# Patient Record
Sex: MALE | Race: BLACK OR AFRICAN AMERICAN
[De-identification: names, ages, dates, MRNs, and addresses within clinical notes are randomized per-mention and may not be internally consistent; named-entity substitution may affect disease eponyms.]

---

## 2018-10-01 ENCOUNTER — HOSPITAL ENCOUNTER (INPATIENT)
Dept: HOSPITAL 74 - JER | Age: 63
LOS: 3 days | Discharge: HOME | DRG: 199 | End: 2018-10-04
Attending: INTERNAL MEDICINE | Admitting: INTERNAL MEDICINE
Payer: COMMERCIAL

## 2018-10-01 VITALS — BODY MASS INDEX: 30.7 KG/M2

## 2018-10-01 DIAGNOSIS — I25.119: ICD-10-CM

## 2018-10-01 DIAGNOSIS — I21.4: ICD-10-CM

## 2018-10-01 DIAGNOSIS — N17.9: ICD-10-CM

## 2018-10-01 DIAGNOSIS — D64.9: ICD-10-CM

## 2018-10-01 DIAGNOSIS — I16.0: ICD-10-CM

## 2018-10-01 DIAGNOSIS — I16.1: Primary | ICD-10-CM

## 2018-10-01 DIAGNOSIS — E87.6: ICD-10-CM

## 2018-10-01 DIAGNOSIS — R31.29: ICD-10-CM

## 2018-10-01 DIAGNOSIS — I13.10: ICD-10-CM

## 2018-10-01 DIAGNOSIS — N18.9: ICD-10-CM

## 2018-10-01 LAB
ALBUMIN SERPL-MCNC: 3.2 G/DL (ref 3.4–5)
ALP SERPL-CCNC: 79 U/L (ref 45–117)
ALT SERPL-CCNC: 28 U/L (ref 13–61)
ANION GAP SERPL CALC-SCNC: 8 MMOL/L (ref 8–16)
ANION GAP SERPL CALC-SCNC: 9 MMOL/L (ref 8–16)
AST SERPL-CCNC: 36 U/L (ref 15–37)
BASOPHILS # BLD: 0.5 % (ref 0–2)
BILIRUB SERPL-MCNC: 0.6 MG/DL (ref 0.2–1)
BUN SERPL-MCNC: 29 MG/DL (ref 7–18)
BUN SERPL-MCNC: 31 MG/DL (ref 7–18)
CALCIUM SERPL-MCNC: 8.1 MG/DL (ref 8.5–10.1)
CALCIUM SERPL-MCNC: 9.2 MG/DL (ref 8.5–10.1)
CHLORIDE SERPL-SCNC: 100 MMOL/L (ref 98–107)
CHLORIDE SERPL-SCNC: 102 MMOL/L (ref 98–107)
CO2 SERPL-SCNC: 33 MMOL/L (ref 21–32)
CO2 SERPL-SCNC: 34 MMOL/L (ref 21–32)
CREAT SERPL-MCNC: 2.2 MG/DL (ref 0.55–1.3)
CREAT SERPL-MCNC: 2.5 MG/DL (ref 0.55–1.3)
DEPRECATED RDW RBC AUTO: 16.5 % (ref 11.9–15.9)
EOSINOPHIL # BLD: 0.5 % (ref 0–4.5)
GLUCOSE SERPL-MCNC: 114 MG/DL (ref 74–106)
GLUCOSE SERPL-MCNC: 201 MG/DL (ref 74–106)
HCT VFR BLD CALC: 38.5 % (ref 35.4–49)
HGB BLD-MCNC: 12.1 GM/DL (ref 11.7–16.9)
LYMPHOCYTES # BLD: 14.3 % (ref 8–40)
MAGNESIUM SERPL-MCNC: 2.7 MG/DL (ref 1.8–2.4)
MCH RBC QN AUTO: 23.7 PG (ref 25.7–33.7)
MCHC RBC AUTO-ENTMCNC: 31.5 G/DL (ref 32–35.9)
MCV RBC: 75.2 FL (ref 80–96)
MONOCYTES # BLD AUTO: 5.6 % (ref 3.8–10.2)
NEUTROPHILS # BLD: 79.1 % (ref 42.8–82.8)
PHOSPHATE SERPL-MCNC: 1.2 MG/DL (ref 2.5–4.9)
PLATELET # BLD AUTO: 230 K/MM3 (ref 134–434)
PMV BLD: 8.5 FL (ref 7.5–11.1)
POTASSIUM SERPLBLD-SCNC: 2.3 MMOL/L (ref 3.5–5.1)
POTASSIUM SERPLBLD-SCNC: 2.3 MMOL/L (ref 3.5–5.1)
PROT SERPL-MCNC: 7.5 G/DL (ref 6.4–8.2)
RBC # BLD AUTO: 5.12 M/MM3 (ref 4–5.6)
SODIUM SERPL-SCNC: 143 MMOL/L (ref 136–145)
SODIUM SERPL-SCNC: 144 MMOL/L (ref 136–145)
WBC # BLD AUTO: 7.4 K/MM3 (ref 4–10)

## 2018-10-01 PROCEDURE — G0378 HOSPITAL OBSERVATION PER HR: HCPCS

## 2018-10-01 RX ADMIN — POTASSIUM CHLORIDE SCH MLS/HR: 7.46 INJECTION, SOLUTION INTRAVENOUS at 17:15

## 2018-10-01 RX ADMIN — POTASSIUM CHLORIDE SCH MLS/HR: 7.46 INJECTION, SOLUTION INTRAVENOUS at 16:07

## 2018-10-01 RX ADMIN — POTASSIUM CHLORIDE SCH MLS/HR: 7.46 INJECTION, SOLUTION INTRAVENOUS at 22:43

## 2018-10-01 NOTE — PDOC
History of Present Illness





- General


History Source: Patient


Exam Limitations: No Limitations





- History of Present Illness


Initial Comments: 





10/01/18 13:44


*Pt is somewhat a poor historian


Pt is a 64yo m with PMH of HTN presenting to ED with elevated blood pressure. 

Pt said he checked his bp yesterday at home with a home bp cuff and said it was 

high. Pt could not remember what the number was. Pt recently moved here from 

[place] around 3 months ago. He saw a physician at the [facility] when he was told 

he had hypertension. He was given lisinopril/hctz 10-12.5. He ran out of his 

medications 2 days ago. He has not seen a physician here. He denies headache, 

changes in vision, chest pain, SOB, back pain, neck pain, numbness/tingling/

weakness, abdominal pain, n/v/d, urinary symptoms, changes in bowel habits. 

Denies history of MI or CVA in self and denies family history of MI/CVA.





PCP: none


PMH: htn


PSH: 3 years ago had suprapubic catheter and L kidney/ureter surgery


Meds: lisinipril/hctz


Allergies: nkda


Social: denies








<Chhaya Perez - Last Filed: 10/01/18 14:14>





<Ania Marino - Last Filed: 10/01/18 15:27>





- General


Chief Complaint: Blood Pressure Problem


Stated Complaint: BLOOD PRESSURE PROBLEM


Time Seen by Provider: 10/01/18 11:05





Past History





- Past Medical History


COPD: No


HTN: Yes





- Immunization History


Immunization Up to Date: Yes





- Suicide/Smoking/Psychosocial Hx


Smoking History: Never smoked


Hx Alcohol Use: No


Drug/Substance Use Hx: No





<Chhaya Perez - Last Filed: 10/01/18 14:14>





<Ania Marino - Last Filed: 10/01/18 15:27>





- Past Medical History


Allergies/Adverse Reactions: 


 Allergies











Allergy/AdvReac Type Severity Reaction Status Date / Time


 


No Known Allergies Allergy   Verified 10/01/18 10:08











Home Medications: 


Ambulatory Orders





Aspirin [ASA -] 81 mg PO DAILY 10/01/18 


Lisinopril/Hydrochlorothiazide [Lisinopril-Hctz 10-12.5 mg Tab] 1 each PO BID 10

/01/18 











**Review of Systems





- Review of Systems


Able to Perform ROS?: Yes


Is the patient limited English proficient: No


Constitutional: No: Chills, Fever, Weakness, Unintentional Wgt. Loss


HEENTM: No: Blurred Vision, Recent change in vision, Double Vision, Hearing Loss

, Throat Pain


Respiratory: No: Cough, Shortness of Breath, Hemoptysis


Cardiac (ROS): No: Chest Pain, Lightheadedness, Palpitations, Syncope


ABD/GI: No: Diarrhea, Nausea, Poor Appetite, Vomiting, Abdominal cramping


: No: Burning, Dysuria, Hematuria


Musculoskeletal: No: Back Pain, Joint Pain, Muscle Pain, Neck Pain


Neurological: No: Headache, Numbness, Paresthesia, Tingling, Weakness


Hematologic/Lymphatic: No: Anemia, Blood Clots





<Chhaya Perez - Last Filed: 10/01/18 14:14>





*Physical Exam





- Vital Signs


 Last Vital Signs











Temp Pulse Resp BP Pulse Ox


 


 98.9 F   100 H  18   228/107 H  98 


 


 10/01/18 10:09  10/01/18 10:09  10/01/18 10:09  10/01/18 10:09  10/01/18 11:35














- Physical Exam


Comments: 





10/01/18 14:14


Pt sitting in bed comfortably


General Appearance: Yes: Nourished, Appropriately Dressed.  No: Apparent 

Distress


HEENT: positive: EOMI, NAEL, Pharynx Normal, Hearing Grossly Normal.  negative: 

Pale Conjunctivae, Photophobia, Scleral Icterus (R), Scleral Icterus (L), 

Pharyngeal Erythema, Nasal Congestion, Sinus Tenderness, Thrush


Neck: positive: Trachea midline, Supple.  negative: Carotid bruit, 

Lymphadenopathy (R), Lymphadenopathy (L)


Respiratory/Chest: positive: Lungs Clear, Normal Breath Sounds.  negative: 

Crackles, Rales, Rhonchi, Stridor, Wheezing


Cardiovascular: positive: Regular Rhythm, S1, S2, Tachycardia, Other (s2 click 

heard over aortic valve).  negative: Edema, JVD, Murmur


Vascular Pulses: Carotid (R): 2+, Carotid (L): 2+, Dorsalis-Pedis (R): 2+, 

Doralis-Pedis (L): 2+


Gastrointestinal/Abdominal: positive: Normal Bowel Sounds, Soft.  negative: 

Distended, Guarding, Rebound, Tenderness


Musculoskeletal: negative: CVA Tenderness, CVA Tenderness (R), CVA Tenderness (L

), Decreased Range of Motion


Extremity: positive: Normal Capillary Refill.  negative: Pedal Edema, Swelling, 

Calf Tenderness, Erythema


Integumentary: positive: Normal Color, Dry, Warm.  negative: Pale, Cold, Rash, 

Swelling


Neurologic: positive: CNs II-XII NML intact, Fully Oriented, Alert, Normal Mood/

Affect, Normal Response, Motor Strength 5/5.  negative: Numbness, Sensory 

Deficit


Deep Tendon Reflexes: Ankle (L): 2+, Ankle (R): 2+, Knee (L): 2+, Knee (R): 2+





<Chhaya Perez - Last Filed: 10/01/18 14:14>





- Vital Signs


 Last Vital Signs











Temp Pulse Resp BP Pulse Ox


 


 98.9 F   89   18   184/111 H  97 


 


 10/01/18 10:09  10/01/18 15:10  10/01/18 15:10  10/01/18 15:10  10/01/18 15:10














<Ania Marino - Last Filed: 10/01/18 15:27>





ED Treatment Course





- LABORATORY


CBC & Chemistry Diagram: 


 10/01/18 12:09





 10/01/18 12:09





- ADDITIONAL ORDERS


Additional order review: 


 Laboratory  Results











  10/01/18 10/01/18 10/01/18





  12:42 12:09 12:09


 


PTT (Actin FS)    37.4 H


 


Sodium   143 


 


Potassium   2.3 L* 


 


Chloride   100 


 


Carbon Dioxide   34 H 


 


Anion Gap   9 


 


BUN   31 H 


 


Creatinine   2.5 H 


 


Creat Clearance w eGFR   26.13 


 


Random Glucose   201 H 


 


Calcium   9.2 


 


Phosphorus   1.2 L 


 


Magnesium   2.7 H 


 


Total Bilirubin   0.6 


 


AST   36 


 


ALT   28 


 


Alkaline Phosphatase   79 


 


Creatine Kinase  628 H  


 


Creatine Kinase Index  0.6  


 


CK-MB (CK-2)  3.8 H  


 


Troponin I  0.20 H  


 


Total Protein   7.5 


 


Albumin   3.2 L 








 











  10/01/18





  12:09


 


RBC  5.12


 


MCV  75.2 L


 


MCHC  31.5 L


 


RDW  16.5 H


 


MPV  8.5


 


Neutrophils %  79.1


 


Lymphocytes %  14.3


 


Monocytes %  5.6


 


Eosinophils %  0.5


 


Basophils %  0.5














- RADIOLOGY


Radiology Studies Ordered: 














 Category Date Time Status


 


 CHEST X-RAY PORTABLE* [RAD] Stat Radiology  10/01/18 11:54 Completed














- Medications


Given in the ED: 


ED Medications














Discontinued Medications














Generic Name Dose Route Start Last Admin





  Trade Name Janeen  PRN Reason Stop Dose Admin


 


Hydrochlorothiazide  12.5 mg  10/01/18 12:06  10/01/18 12:14





  Hctz -  PO  10/01/18 12:07  12.5 mg





  ONCE ONE   Administration





     





     





     





     


 


Lisinopril  10 mg  10/01/18 12:06  10/01/18 12:14





  Prinivil  PO  10/01/18 12:07  10 mg





  ONCE ONE   Administration





     





     





     





     














<Chhaya Perez - Last Filed: 10/01/18 14:14>





- LABORATORY


CBC & Chemistry Diagram: 


 10/01/18 12:09





 10/01/18 12:09





- ADDITIONAL ORDERS


Additional order review: 


 Laboratory  Results











  10/01/18 10/01/18 10/01/18





  12:42 12:09 12:09


 


PTT (Actin FS)    37.4 H


 


Sodium   143 


 


Potassium   2.3 L* 


 


Chloride   100 


 


Carbon Dioxide   34 H 


 


Anion Gap   9 


 


BUN   31 H 


 


Creatinine   2.5 H 


 


Creat Clearance w eGFR   26.13 


 


Random Glucose   201 H 


 


Calcium   9.2 


 


Phosphorus   1.2 L 


 


Magnesium   2.7 H 


 


Total Bilirubin   0.6 


 


AST   36 


 


ALT   28 


 


Alkaline Phosphatase   79 


 


Creatine Kinase  628 H  


 


Creatine Kinase Index  0.6  


 


CK-MB (CK-2)  3.8 H  


 


Troponin I  0.20 H  


 


Total Protein   7.5 


 


Albumin   3.2 L 








 











  10/01/18





  12:09


 


RBC  5.12


 


MCV  75.2 L


 


MCHC  31.5 L


 


RDW  16.5 H


 


MPV  8.5


 


Neutrophils %  79.1


 


Lymphocytes %  14.3


 


Monocytes %  5.6


 


Eosinophils %  0.5


 


Basophils %  0.5














- Medications


Given in the ED: 


ED Medications














Discontinued Medications














Generic Name Dose Route Start Last Admin





  Trade Name Janeen  PRN Reason Stop Dose Admin


 


Hydrochlorothiazide  12.5 mg  10/01/18 12:06  10/01/18 12:14





  Hctz -  PO  10/01/18 12:07  12.5 mg





  ONCE ONE   Administration





     





     





     





     


 


Labetalol HCl  20 mg  10/01/18 13:28  10/01/18 14:07





  Normodyne Injection -  IVPUSH  10/01/18 13:29  Not Given





  ONCE ONE   





     





     





     





     


 


Lisinopril  10 mg  10/01/18 12:06  10/01/18 12:14





  Prinivil  PO  10/01/18 12:07  10 mg





  ONCE ONE   Administration





     





     





     





     














<Ania Marino - Last Filed: 10/01/18 15:27>





Medical Decision Making





- Medical Decision Making





10/01/18 13:49


64yo m with PMH of htn presenting with elevated blood pressure. BP at 

evaluation was 240/120. Pt not having any symptoms. 


   Vitals: hypertensive, tachycardic, afebrile


   PE: click of aortic valve on S2. Otherwise benign





Ddx: htn urgency v. emergency





Will order basic labs, ekg, troponin, ua to evaluate for end organ damage. 





10/01/18 13:56


Repeat bp 197/101, hr 90, 98% saturation





<Chhaya Perez - Last Filed: 10/01/18 14:14>





*DC/Admit/Observation/Transfer





- Discharge Dispostion


Decision to Admit order: Yes


Decision to Admit order Date/Time: 


Decision to Admit Order











 Category Date Time Status


 


 Decision to Admit to Hospital Routine Admission  10/01/18 13:28 Active














<Chhaya Perez - Last Filed: 10/01/18 14:14>





<Ania Marino - Last Filed: 10/01/18 15:27>


Diagnosis at time of Disposition: 


 Hypertensive emergency, NSTEMI (non-ST elevated myocardial infarction), 

Elevated serum creatinine, Hypokalemia








- Discharge Dispostion


Condition at time of disposition: Fair

## 2018-10-01 NOTE — HP
Admitting History and Physical





- Primary Care Physician


PCP: Tl Benjamin





- Admission


Chief Complaint: elevated bp


History of Present Illness: 





Pt is a 62yo m with PMH of HTN presenting to ED with elevated blood pressure. 

Pt said he checked his bp yesterday at home with a home bp cuff and said it was 

high. Pt could not remember what the number was. Pt recently moved here from 

[place] around 3 months ago. He saw a physician at the [facility] when he was told 

he had hypertension. He was given lisinopril/hctz 10-12.5. He ran out of his 

medications 2 days ago. He has not seen a physician here. He denies headache, 

changes in vision, chest pain, SOB, back pain, neck pain, numbness/tingling/

weakness, abdominal pain, n/v/d, urinary symptoms, changes in bowel habits. 

Denies history of MI or CVA in self and denies family history of MI/CVA.














- Past Medical History


Cardiovascular: Yes: HTN





- Smoking History


Smoking history: Never smoked





- Alcohol/Substance Use


Hx Alcohol Use: No





Home Medications





- Allergies


Allergies/Adverse Reactions: 


 Allergies











Allergy/AdvReac Type Severity Reaction Status Date / Time


 


No Known Allergies Allergy   Verified 10/01/18 10:08














- Home Medications


Home Medications: 


Ambulatory Orders





Aspirin [ASA -] 81 mg PO DAILY 10/01/18 


Carvedilol [Coreg -] 12.5 mg PO BID #60 tablet 10/04/18 


Nifedipine ER [Procardia XL -] 60 mg PO DAILY #30 tab.er.24 10/04/18 











Physical Examination


Vital Signs: 


 Vital Signs











Temperature  98.9 F   10/01/18 10:09


 


Pulse Rate  89   10/01/18 15:10


 


Respiratory Rate  18   10/01/18 15:10


 


Blood Pressure  184/111 H  10/01/18 15:10


 


O2 Sat by Pulse Oximetry (%)  97   10/01/18 15:10











Constitutional: Yes: No Distress


HENT: Yes: Atraumatic


Neck: Yes: Supple


Cardiovascular: Yes: Regular Rate and Rhythm


Respiratory: Yes: CTA Bilaterally


Gastrointestinal: Yes: Normal Bowel Sounds


Extremities: Yes: WNL


Edema: No


Peripheral Pulses WNL: Yes


Neurological: Yes: Alert, Oriented


Labs: 


 CBC, BMP





 10/01/18 12:09 





 10/01/18 12:09 











Imaging





- Results


X-ray: Report Reviewed





Problem List





- Problems


(1) Acute-on-chronic kidney injury


Code(s): N17.9 - ACUTE KIDNEY FAILURE, UNSPECIFIED; N18.9 - CHRONIC KIDNEY 

DISEASE, UNSPECIFIED   


Qualifiers: 


   Acute renal failure type: unspecified 





(2) Demand ischemia


Code(s): I24.8 - OTHER FORMS OF ACUTE ISCHEMIC HEART DISEASE   





(3) Elevated serum creatinine


Code(s): R79.89 - OTHER SPECIFIED ABNORMAL FINDINGS OF BLOOD CHEMISTRY   





(4) Hypertensive urgency


Code(s): I16.0 - HYPERTENSIVE URGENCY   





Assessment/Plan





 Laboratory Tests











  10/01/18 10/01/18 10/01/18





  12:09 12:09 12:09


 


WBC  7.4  


 


RBC  5.12  


 


Hgb  12.1  


 


Hct  38.5  


 


MCV  75.2 L  


 


MCH  23.7 L  


 


MCHC  31.5 L  


 


RDW  16.5 H  


 


Plt Count  230  


 


MPV  8.5  


 


Absolute Neuts (auto)  5.9  


 


Neutrophils %  79.1  


 


Lymphocytes %  14.3  


 


Monocytes %  5.6  


 


Eosinophils %  0.5  


 


Basophils %  0.5  


 


Nucleated RBC %  0  


 


PTT (Actin FS)   37.4 H 


 


Sodium    143


 


Potassium    2.3 L*


 


Chloride    100


 


Carbon Dioxide    34 H


 


Anion Gap    9


 


BUN    31 H


 


Creatinine    2.5 H


 


Creat Clearance w eGFR    26.13


 


Random Glucose    201 H


 


Serum Osmolality   


 


Calcium    9.2


 


Phosphorus    1.2 L


 


Magnesium    2.7 H


 


Total Bilirubin    0.6


 


AST    36


 


ALT    28


 


Alkaline Phosphatase    79


 


Creatine Kinase   


 


Creatine Kinase Index   


 


CK-MB (CK-2)   


 


Troponin I   


 


Total Protein    7.5


 


Albumin    3.2 L


 


Urine Color   


 


Urine Appearance   


 


Urine pH   


 


Ur Specific Gravity   


 


Urine Protein   


 


Urine Glucose (UA)   


 


Urine Ketones   


 


Urine Blood   


 


Urine Nitrite   


 


Urine Bilirubin   


 


Urine Urobilinogen   


 


Ur Leukocyte Esterase   


 


Urine WBC (Auto)   


 


Urine RBC (Auto)   


 


Ur Epithelial Cells   


 


Urine Bacteria   


 


Urine Mucus   


 


Urine Osmolality   


 


Ur Random Sodium   


 


Ur Random Potassium   


 


Ur Random Chloride   














  10/01/18 10/01/18 10/02/18





  12:42 23:10 02:48


 


WBC   


 


RBC   


 


Hgb   


 


Hct   


 


MCV   


 


MCH   


 


MCHC   


 


RDW   


 


Plt Count   


 


MPV   


 


Absolute Neuts (auto)   


 


Neutrophils %   


 


Lymphocytes %   


 


Monocytes %   


 


Eosinophils %   


 


Basophils %   


 


Nucleated RBC %   


 


PTT (Actin FS)   


 


Sodium   144 


 


Potassium   2.3 L* 


 


Chloride   102 


 


Carbon Dioxide   33 H 


 


Anion Gap   8 


 


BUN   29 H 


 


Creatinine   2.2 H 


 


Creat Clearance w eGFR   30.28 


 


Random Glucose   114 H 


 


Serum Osmolality   


 


Calcium   8.1 L 


 


Phosphorus   


 


Magnesium   


 


Total Bilirubin   


 


AST   


 


ALT   


 


Alkaline Phosphatase   


 


Creatine Kinase  628 H  469 H 


 


Creatine Kinase Index  0.6  0.5 


 


CK-MB (CK-2)  3.8 H  2.7 


 


Troponin I  0.20 H  0.19 H 


 


Total Protein   


 


Albumin   


 


Urine Color    Straw


 


Urine Appearance    Clear


 


Urine pH    7.0


 


Ur Specific [place]    1.011


 


Urine Protein    Negative


 


Urine Glucose (UA)    Negative


 


Urine Ketones    Negative


 


Urine Blood    2+ H


 


Urine Nitrite    Negative


 


Urine Bilirubin    Negative


 


Urine Urobilinogen    Negative


 


Ur Leukocyte Esterase    Trace


 


Urine WBC (Auto)    6


 


Urine RBC (Auto)    14


 


Ur Epithelial Cells    Rare


 


Urine Bacteria    Few


 


Urine Mucus    Rare


 


Urine Osmolality   


 


Ur Random Sodium   


 


Ur Random Potassium   


 


Ur Random Chloride   














  10/02/18 10/02/18 10/02/18





  05:30 05:30 12:48


 


WBC  6.9  


 


RBC  4.50  


 


Hgb  10.8 L  


 


Hct  33.8 L  


 


MCV  75.0 L  


 


MCH  24.1 L  


 


MCHC  32.1  


 


RDW  16.3 H  


 


Plt Count  208  


 


MPV  8.7  


 


Absolute Neuts (auto)  4.6  


 


Neutrophils %  66.4  


 


Lymphocytes %  24.1  D  


 


Monocytes %  6.9  


 


Eosinophils %  2.0  D  


 


Basophils %  0.6  


 


Nucleated RBC %  0  


 


PTT (Actin FS)   


 


Sodium   144  142


 


Potassium   2.3 L*  2.8 L*


 


Chloride   102  101


 


Carbon Dioxide   31  36 H


 


Anion Gap   11  5 L


 


BUN   27 H  28 H


 


Creatinine   2.1 H  2.0 H


 


Creat Clearance w eGFR   31.96  33.81


 


Random Glucose   95  84


 


Serum Osmolality   


 


Calcium   8.5  8.5


 


Phosphorus   


 


Magnesium   


 


Total Bilirubin   0.7 


 


AST   28 


 


ALT   20 


 


Alkaline Phosphatase   68 


 


Creatine Kinase   


 


Creatine Kinase Index   


 


CK-MB (CK-2)   


 


Troponin I   


 


Total Protein   6.4 


 


Albumin   2.8 L 


 


Urine Color   


 


Urine Appearance   


 


Urine pH   


 


Ur Specific Gravity   


 


Urine Protein   


 


Urine Glucose (UA)   


 


Urine Ketones   


 


Urine Blood   


 


Urine Nitrite   


 


Urine Bilirubin   


 


Urine Urobilinogen   


 


Ur Leukocyte Esterase   


 


Urine WBC (Auto)   


 


Urine RBC (Auto)   


 


Ur Epithelial Cells   


 


Urine Bacteria   


 


Urine Mucus   


 


Urine Osmolality   


 


Ur Random Sodium   


 


Ur Random Potassium   


 


Ur Random Chloride   














  10/02/18 10/02/18 10/03/18





  12:48 18:00 05:30


 


WBC   


 


RBC   


 


Hgb   


 


Hct   


 


MCV   


 


MCH   


 


MCHC   


 


RDW   


 


Plt Count   


 


MPV   


 


Absolute Neuts (auto)   


 


Neutrophils %   


 


Lymphocytes %   


 


Monocytes %   


 


Eosinophils %   


 


Basophils %   


 


Nucleated RBC %   


 


PTT (Actin FS)   


 


Sodium   138  144


 


Potassium   2.7 L*  3.0 L


 


Chloride   103  106


 


Carbon Dioxide   28  27


 


Anion Gap   7 L  11


 


BUN   25 H  21 H


 


Creatinine   2.0 H  1.8 H


 


Creat Clearance w eGFR   33.81  38.18


 


Random Glucose   113 H  88


 


Serum Osmolality  296  


 


Calcium   8.5  8.3 L


 


Phosphorus   


 


Magnesium   


 


Total Bilirubin    0.7


 


AST    29


 


ALT    21


 


Alkaline Phosphatase    73


 


Creatine Kinase   


 


Creatine Kinase Index   


 


CK-MB (CK-2)   


 


Troponin I   


 


Total Protein    6.6


 


Albumin    2.8 L


 


Urine Color   


 


Urine Appearance   


 


Urine pH   


 


Ur Specific Gravity   


 


Urine Protein   


 


Urine Glucose (UA)   


 


Urine Ketones   


 


Urine Blood   


 


Urine Nitrite   


 


Urine Bilirubin   


 


Urine Urobilinogen   


 


Ur Leukocyte Esterase   


 


Urine WBC (Auto)   


 


Urine RBC (Auto)   


 


Ur Epithelial Cells   


 


Urine Bacteria   


 


Urine Mucus   


 


Urine Osmolality   


 


Ur Random Sodium   


 


Ur Random Potassium   


 


Ur Random Chloride   














  10/03/18 10/03/18 10/03/18





  08:30 08:30 16:55


 


WBC   


 


RBC   


 


Hgb   


 


Hct   


 


MCV   


 


MCH   


 


MCHC   


 


RDW   


 


Plt Count   


 


MPV   


 


Absolute Neuts (auto)   


 


Neutrophils %   


 


Lymphocytes %   


 


Monocytes %   


 


Eosinophils %   


 


Basophils %   


 


Nucleated RBC %   


 


PTT (Actin FS)   


 


Sodium    138


 


Potassium    3.2 L


 


Chloride    106


 


Carbon Dioxide    27


 


Anion Gap    6 L


 


BUN    21 H


 


Creatinine    1.8 H


 


Creat Clearance w eGFR    38.18


 


Random Glucose    106


 


Serum Osmolality   


 


Calcium    8.5


 


Phosphorus   


 


Magnesium   


 


Total Bilirubin   


 


AST   


 


ALT   


 


Alkaline Phosphatase   


 


Creatine Kinase   


 


Creatine Kinase Index   


 


CK-MB (CK-2)   


 


Troponin I   


 


Total Protein   


 


Albumin   


 


Urine Color   


 


Urine Appearance   


 


Urine pH   


 


Ur Specific Gravity   


 


Urine Protein   


 


Urine Glucose (UA)   


 


Urine Ketones   


 


Urine Blood   


 


Urine Nitrite   


 


Urine Bilirubin   


 


Urine Urobilinogen   


 


Ur Leukocyte Esterase   


 


Urine WBC (Auto)   


 


Urine RBC (Auto)   


 


Ur Epithelial Cells   


 


Urine Bacteria   


 


Urine Mucus   


 


Urine Osmolality   454 


 


Ur Random Sodium  129  


 


Ur Random Potassium  18.0 L  


 


Ur Random Chloride  120  














  10/04/18





  07:05


 


WBC 


 


RBC 


 


Hgb 


 


Hct 


 


MCV 


 


MCH 


 


MCHC 


 


RDW 


 


Plt Count 


 


MPV 


 


Absolute Neuts (auto) 


 


Neutrophils % 


 


Lymphocytes % 


 


Monocytes % 


 


Eosinophils % 


 


Basophils % 


 


Nucleated RBC % 


 


PTT (Actin FS) 


 


Sodium  141


 


Potassium  3.5


 


Chloride  106


 


Carbon Dioxide  28


 


Anion Gap  6 L


 


BUN  25 H


 


Creatinine  1.9 H


 


Creat Clearance w eGFR  35.87


 


Random Glucose  91


 


Serum Osmolality 


 


Calcium  8.9


 


Phosphorus 


 


Magnesium 


 


Total Bilirubin  0.6


 


AST  28


 


ALT  20


 


Alkaline Phosphatase  72


 


Creatine Kinase 


 


Creatine Kinase Index 


 


CK-MB (CK-2) 


 


Troponin I 


 


Total Protein  6.9


 


Albumin  2.8 L


 


Urine Color 


 


Urine Appearance 


 


Urine pH 


 


Ur Specific Gravity 


 


Urine Protein 


 


Urine Glucose (UA) 


 


Urine Ketones 


 


Urine Blood 


 


Urine Nitrite 


 


Urine Bilirubin 


 


Urine Urobilinogen 


 


Ur Leukocyte Esterase 


 


Urine WBC (Auto) 


 


Urine RBC (Auto) 


 


Ur Epithelial Cells 


 


Urine Bacteria 


 


Urine Mucus 


 


Urine Osmolality 


 


Ur Random Sodium 


 


Ur Random Potassium 


 


Ur Random Chloride 








Active Medications











Generic Name Dose Route Start Last Admin





  Trade Name Freq  PRN Reason Stop Dose Admin


 


Carvedilol  12.5 mg  10/04/18 11:20  





  Coreg -  PO   





  BID GUALBERTO   





     





     





     





     


 


Nifedipine  60 mg  10/04/18 10:00  10/04/18 11:11





  Procardia Xl -  PO   60 mg





  DAILY GUALBERTO   Administration





     





     





     





     


 


Potassium Chloride  40 meq  10/02/18 10:00  10/04/18 11:11





  K-Dur -  PO   40 meq





  DAILY GUALBERTO   Administration

## 2018-10-01 NOTE — PDOC
Attending Attestation





- Resident


Resident Name: Chhaya Perez





- ED Attending Attestation


I have performed the following: I have examined & evaluated the patient, The 

case was reviewed & discussed with the resident, I agree w/resident's findings 

& plan





- HPI


HPI: 





10/01/18 15:25





63 YOM, with a significant past medical history of hypertension, who presents 

to the emergency department with, elevated blood pressure. As per patient, he 

arrived from [place] 3 months ago and was seen by a [facility] doctor who put 

him on Lisinopril/HCTZ BID. Patient notes that he ran out of blood pressure 

medication 4 days ago. He took his blood pressure using an at home cuff and 

obtained a reading of 248/120. Patient notes that his blood pressure was not 

well controlled with his medication. He was unable to report to the ED 

yesterday thus, prompting his visit today. While in the ED, the patient notes 

to be asymptomatic without any complaints.





He denies any recent fevers, chills, headache or dizziness. He denies any 

recent nausea, vomit, diarrhea or constipation. He denies any recent chest pain 

or shortness of breath. He denies any recent dysuria, frequency, urgency or 

hematuria.





Allergies: NKA


Past surgical history: None reported.


Social History: Nonsmoker. Denies EtOH use and recreational drug use. 











- Physicial Exam


PE: 





10/01/18 15:26





NAD, well appearing, MMM, nl conjunctiva, anicteric; neck supple. no JVD. lungs 

clear, RRR, +systolic murmur, abdomen soft nontender. ARRIAZA x4, no focal neuro 

deficits. No peripheral edema. normal color for ethnicity, WW.








- Medical Decision Making





10/01/18 13:32





Becky 63 YOM from [place] x 3  months ago presenting with asymptomatic 

hypertension. Ran out of antihypertensives (lisinopril/hctz combo, which he has 

been taking x 2 weeks) 2 days ago, BP rechecked at home which has remained 

elevated.


denies symptoms. no recent illnesses.





Vitals notable for elevated BP: 248/120, . No symptoms.


EKG normal sinus rhythm, no interval abnormalities, narrow QRS, ST and T wave 

segments and morphology normal. Nonspecific T wave abnormalities. LVH, left 

axis deviation.


Labs and lytes with elevated Cr 2.5 and trop, with e/o end organ damage in the 

setting of hypertensive emergency.


trop elevated 0.2, given ASA and on tele monitoring.


potassium low 2.3, on tele and repleted with 3 runs of IV potassium and PO 

40meq. 


Given PO lisinopril/hctz, slow reduction in BP down to 180s/100s, allowing for 

MAP reduction by no more than 25% in first 24 hours.





Will admit for BP management, continuous tele monitoring and medical management

, electrolyte monitoring. Dr Benjamin to admit.


Dx. hypertensive emergency, ZACK, electrolyte derangements with hypo-K.





10/01/18 15:25

## 2018-10-02 LAB
ALBUMIN SERPL-MCNC: 2.8 G/DL (ref 3.4–5)
ALP SERPL-CCNC: 68 U/L (ref 45–117)
ALT SERPL-CCNC: 20 U/L (ref 13–61)
ANION GAP SERPL CALC-SCNC: 11 MMOL/L (ref 8–16)
ANION GAP SERPL CALC-SCNC: 5 MMOL/L (ref 8–16)
ANION GAP SERPL CALC-SCNC: 7 MMOL/L (ref 8–16)
APPEARANCE UR: CLEAR
AST SERPL-CCNC: 28 U/L (ref 15–37)
BACTERIA #/AREA URNS HPF: (no result) /HPF
BASOPHILS # BLD: 0.6 % (ref 0–2)
BILIRUB SERPL-MCNC: 0.7 MG/DL (ref 0.2–1)
BILIRUB UR STRIP.AUTO-MCNC: NEGATIVE MG/DL
BUN SERPL-MCNC: 25 MG/DL (ref 7–18)
BUN SERPL-MCNC: 27 MG/DL (ref 7–18)
BUN SERPL-MCNC: 28 MG/DL (ref 7–18)
CALCIUM SERPL-MCNC: 8.5 MG/DL (ref 8.5–10.1)
CHLORIDE SERPL-SCNC: 101 MMOL/L (ref 98–107)
CHLORIDE SERPL-SCNC: 102 MMOL/L (ref 98–107)
CHLORIDE SERPL-SCNC: 103 MMOL/L (ref 98–107)
CO2 SERPL-SCNC: 28 MMOL/L (ref 21–32)
CO2 SERPL-SCNC: 31 MMOL/L (ref 21–32)
CO2 SERPL-SCNC: 36 MMOL/L (ref 21–32)
COLOR UR: (no result)
CREAT SERPL-MCNC: 2 MG/DL (ref 0.55–1.3)
CREAT SERPL-MCNC: 2 MG/DL (ref 0.55–1.3)
CREAT SERPL-MCNC: 2.1 MG/DL (ref 0.55–1.3)
DEPRECATED RDW RBC AUTO: 16.3 % (ref 11.9–15.9)
EOSINOPHIL # BLD: 2 % (ref 0–4.5)
EPITH CASTS URNS QL MICRO: (no result) /HPF
GLUCOSE SERPL-MCNC: 113 MG/DL (ref 74–106)
GLUCOSE SERPL-MCNC: 84 MG/DL (ref 74–106)
GLUCOSE SERPL-MCNC: 95 MG/DL (ref 74–106)
HCT VFR BLD CALC: 33.8 % (ref 35.4–49)
HGB BLD-MCNC: 10.8 GM/DL (ref 11.7–16.9)
KETONES UR QL STRIP: NEGATIVE
LEUKOCYTE ESTERASE UR QL STRIP.AUTO: (no result)
LYMPHOCYTES # BLD: 24.1 % (ref 8–40)
MCH RBC QN AUTO: 24.1 PG (ref 25.7–33.7)
MCHC RBC AUTO-ENTMCNC: 32.1 G/DL (ref 32–35.9)
MCV RBC: 75 FL (ref 80–96)
MONOCYTES # BLD AUTO: 6.9 % (ref 3.8–10.2)
MUCOUS THREADS URNS QL MICRO: (no result)
NEUTROPHILS # BLD: 66.4 % (ref 42.8–82.8)
NITRITE UR QL STRIP: NEGATIVE
PH UR: 7 [PH] (ref 5–8)
PLATELET # BLD AUTO: 208 K/MM3 (ref 134–434)
PMV BLD: 8.7 FL (ref 7.5–11.1)
POTASSIUM SERPLBLD-SCNC: 2.3 MMOL/L (ref 3.5–5.1)
POTASSIUM SERPLBLD-SCNC: 2.7 MMOL/L (ref 3.5–5.1)
POTASSIUM SERPLBLD-SCNC: 2.8 MMOL/L (ref 3.5–5.1)
PROT SERPL-MCNC: 6.4 G/DL (ref 6.4–8.2)
PROT UR QL STRIP: NEGATIVE
PROT UR QL STRIP: NEGATIVE
RBC # BLD AUTO: 4.5 M/MM3 (ref 4–5.6)
SODIUM SERPL-SCNC: 138 MMOL/L (ref 136–145)
SODIUM SERPL-SCNC: 142 MMOL/L (ref 136–145)
SODIUM SERPL-SCNC: 144 MMOL/L (ref 136–145)
SP GR UR: 1.01 (ref 1–1.03)
UROBILINOGEN UR STRIP-MCNC: NEGATIVE MG/DL (ref 0.2–1)
WBC # BLD AUTO: 6.9 K/MM3 (ref 4–10)

## 2018-10-02 RX ADMIN — HYDRALAZINE HYDROCHLORIDE SCH: 25 TABLET, FILM COATED ORAL at 22:21

## 2018-10-02 RX ADMIN — POTASSIUM CHLORIDE SCH MEQ: 1500 TABLET, EXTENDED RELEASE ORAL at 14:43

## 2018-10-02 RX ADMIN — POTASSIUM CHLORIDE SCH MLS/HR: 7.46 INJECTION, SOLUTION INTRAVENOUS at 21:46

## 2018-10-02 RX ADMIN — POTASSIUM CHLORIDE SCH MLS/HR: 7.46 INJECTION, SOLUTION INTRAVENOUS at 18:54

## 2018-10-02 RX ADMIN — POTASSIUM CHLORIDE SCH MLS/HR: 7.46 INJECTION, SOLUTION INTRAVENOUS at 11:11

## 2018-10-02 RX ADMIN — POTASSIUM CHLORIDE SCH MLS/HR: 7.46 INJECTION, SOLUTION INTRAVENOUS at 12:04

## 2018-10-02 RX ADMIN — POTASSIUM CHLORIDE SCH MLS/HR: 7.46 INJECTION, SOLUTION INTRAVENOUS at 09:58

## 2018-10-02 RX ADMIN — HYDRALAZINE HYDROCHLORIDE SCH MG: 25 TABLET, FILM COATED ORAL at 22:20

## 2018-10-02 RX ADMIN — POTASSIUM CHLORIDE SCH MLS/HR: 7.46 INJECTION, SOLUTION INTRAVENOUS at 18:02

## 2018-10-02 RX ADMIN — POTASSIUM CHLORIDE SCH MLS/HR: 7.46 INJECTION, SOLUTION INTRAVENOUS at 23:34

## 2018-10-02 RX ADMIN — POTASSIUM CHLORIDE SCH MLS/HR: 7.46 INJECTION, SOLUTION INTRAVENOUS at 22:20

## 2018-10-02 RX ADMIN — AMLODIPINE BESYLATE SCH MG: 10 TABLET ORAL at 09:59

## 2018-10-02 NOTE — PN
Progress Note (short form)





- Note


Progress Note: 





 Laboratory Tests











  10/02/18





  18:00


 


Sodium  138


 


Potassium  2.7 L*


 


Creatinine  2.0 H








will supplement potassium





Problem List





- Problems


(1) Hypertensive emergency


Code(s): I16.1 - HYPERTENSIVE EMERGENCY   





(2) Hypokalemia


Code(s): E87.6 - HYPOKALEMIA

## 2018-10-02 NOTE — PN
Progress Note (short form)





- Note


Progress Note: 


Chief Complaint: Events noted, notes reviewed, denies any chest pain or dyspnea 





History of Present Illness: 


Seen and examined on telemetry. Full consult 





- Current Medication List


 Current Medications





Amlodipine Besylate (Norvasc -)  10 mg PO DAILY [facility]


   Last Admin: 10/02/18 09:59 Dose:  10 mg


Metoprolol Succinate (Toprol Xl -)  25 mg PO DAILY [facility]


   Last Admin: 10/02/18 09:59 Dose:  25 mg


Potassium Chloride (K-Dur -)  40 meq PO DAILY [facility]








Review of Systems





- Review of Systems


Constitutional: denies: Chills or Fever


Cardiovascular: as noted above


Gastrointestinal: denies: Nausea, Vomiting, Diarrhea, Constipation or Abdominal 

Pain


Genitourinary: No symptoms reported


Neurological: right upper extremity weakness





- Objective


Vital Signs: 


 Last Vital Signs











Temp Pulse Resp BP Pulse Ox


 


 98.8 F   78   18   144/77   99 


 


 10/02/18 06:00  10/02/18 09:26  10/02/18 09:26  10/02/18 09:26  10/02/18 04:09








 Intake & Output











 09/29/18 09/30/18 10/01/18 10/02/18





 23:59 23:59 23:59 23:59


 


Intake Total    500


 


Balance    500


 


Weight   176 lb 179 lb











Neck: Supple Negative JVD No Bruit


Cardiovascular: S1 S2 Regular Rate Rhythm Grade 1-2/6 systolic ejection murmur


Respiratory: Clear to A&P


Gastrointestinal: Soft Benign Normal Bowel Sounds


Ext: Negative Edema





Labs: 


 Troponin, BNP











  10/01/18





  12:42


 


Troponin I  0.20 H








 CBC, BMP





 10/02/18 05:30 





 10/02/18 05:30 





 Hepatic Panel











Total Bilirubin  0.7 mg/dL (0.2-1)   10/02/18  05:30    


 


AST  28 U/L (15-37)   10/02/18  05:30    


 


ALT  20 U/L (13-61)   10/02/18  05:30    


 


Alkaline Phosphatase  68 U/L ()   10/02/18  05:30    


 


Albumin  2.8 g/dl (3.4-5.0)  L  10/02/18  05:30    








 Assessment/Plan 





ASSESSMENT:





1. Hypertensive cardiovascular disease, labile blood pressure uncontrolled, 

medical therapy non administration


2. CAD angina pectoris with evidence of demand ischemia


3. Probable diastolic LV dysfunction with class 0 NYHA classification LV failure


4. Heart murmur related to probable aortic valve sclerosis


5. Questionable history of CVA with right upper extremity weakness


6. CKD


7. Anemia, etiology to be determined


8. Hypokalemia





PLAN:





1. Continue Toprol XL and titrate dosage


2. Continue Norvasc


3. Ideally should be on ACEI or ARBS unless contraindicated, pending renal 

function stabilization


4. Correction of Hypokalemia


5. Echocardiography for evaluation of LV size and function and the above noted 

heart murmur














Hitesh Sanchez MD

## 2018-10-02 NOTE — EKG
Test Reason : 

Blood Pressure : ***/*** mmHG

Vent. Rate : 085 BPM     Atrial Rate : 085 BPM

   P-R Int : 228 ms          QRS Dur : 112 ms

    QT Int : 406 ms       P-R-T Axes : 037 -39 017 degrees

   QTc Int : 483 ms

 

*** POOR DATA QUALITY, INTERPRETATION MAY BE ADVERSELY AFFECTED

SINUS RHYTHM WITH 1ST DEGREE A-V BLOCK

POSSIBLE LEFT ATRIAL ENLARGEMENT

LEFT AXIS DEVIATION

INCOMPLETE RIGHT BUNDLE BRANCH BLOCK

LEFT VENTRICULAR HYPERTROPHY

PROLONGED QT

ABNORMAL ECG

NO PREVIOUS ECGS AVAILABLE

Confirmed by Hernesto Jacob MD ([id number]) on 10/2/2018 10:44:51 AM

 

Referred By:             Confirmed By:Hernesto Jacob MD

## 2018-10-02 NOTE — ECHO
______________________________________________________________________________



Name: LUCY DIEHL                                  Exam:Adult Echocardiogram

MRN: [id number]         Study Date: 10/02/2018 09:15 AM

Age: 63 yrs

______________________________________________________________________________



Reason For Study: HTN

Height: 64 in        Weight: 176 lb        BSA: 1.9 m2



______________________________________________________________________________



MMode/2D Measurements & Calculations

IVSd: 1.6 cm                                        Ao root diam: 2.8 cm

LVIDd: 3.7 cm                                       LA dimension: 4.2 cm

LVIDs: 2.8 cm

LVPWd: 1.3 cm



_____________________________________________________

EDV(Teich): 58.9 ml                                 LAV (MOD-bp): 38.2 ml

ESV(Teich): 29.2 ml



Doppler Measurements & Calculations

MV E max wisam: 50.9 cm/sec                                 AI P1/2t: 1163 msec

MV A max wisam: 99.0 cm/sec

MV E/A: 0.51

MV dec time: 0.14 sec



___________________________________________________________

AI max wisam: 286.4 cm/sec                                  TR max wisam: 175.1 cm/sec

AI max P.8 mmHg                                      TR max P.3 mmHg



AI dec slope: 72.1 cm/sec2

___________________________________________________________

Med Peak E' Wisam: 4.8 cm/sec                               PI Vmax: 103.2 cm/sec

Med E/e': 10.6

Lat Peak E' Wisam: 5.5 cm/sec

Lat E/e': 9.2





______________________________________________________________________________



Procedure

A complete two-dimensional transthoracic echocardiogram was performed (2D, M-mode, Doppler and color 
flow

Doppler).

Left Ventricle

The left ventricle is normal in size. There is moderate concentric left ventricular hypertrophy. The 
left

ventricle is hyperdynamic. Ejection Fraction = 70%. The transmitral spectral Doppler flow pattern is

suggestive of impaired LV relaxation.

Right Ventricle

The right ventricle is normal in size and function.

Atria

The left atrium is mildly dilated. Right atrial size is normal.

Mitral Valve

The mitral valve is grossly normal. There is trace mitral regurgitation.

Tricuspid Valve

There is mild tricuspid regurgitation. Right ventricular systolic pressure is normal.

Aortic Valve

There is mild aortic sclerosis.;. Mild aortic regurgitation.

Pulmonic Valve

The pulmonic valve is not well visualized.

Great Vessels

The aortic root is normal size.

Pericardium/Pleura

There is no pericardial effusion. There is no pleural effusion.

______________________________________________________________________________





Interpretation Summary

The left ventricle is normal in size.

There is moderate concentric left ventricular hypertrophy.

The left ventricle is hyperdynamic.

Ejection Fraction = 70%.

The left atrium is mildly dilated.

There is trace mitral regurgitation.

There is mild tricuspid regurgitation.

Right ventricular systolic pressure is normal.

Mild aortic regurgitation.





MD Hernesto Jacob 10/02/2018 02:09 PM

## 2018-10-02 NOTE — PN
Progress Note, Physician





- Current Medication List


Current Medications: 


Active Medications





Amlodipine Besylate (Norvasc -)  10 mg PO DAILY [facility]


   Last Admin: 10/02/18 09:59 Dose:  10 mg


Hydralazine HCl (Apresoline -)  25 mg PO BID [facility]


   Last Admin: 10/02/18 22:21 Dose:  Not Given


Potassium Chloride (Potassium Chloride 10 Meq Premix Ivpb -)  10 meq in 100 mls 

@ 100 mls/hr IVPB Q60M [facility]


   Stop: 10/03/18 01:59


   Last Admin: 10/02/18 22:20 Dose:  100 mls/hr


Metoprolol Succinate (Toprol Xl -)  25 mg PO DAILY [facility]


   Last Admin: 10/02/18 09:59 Dose:  25 mg


Potassium Chloride (K-Dur -)  40 meq PO DAILY [facility]


   Last Admin: 10/02/18 14:43 Dose:  40 meq











- Objective


Vital Signs: 


 Vital Signs











Temperature  99.0 F   10/02/18 21:00


 


Pulse Rate  68   10/02/18 21:00


 


Respiratory Rate  20   10/02/18 21:00


 


Blood Pressure  187/105 H  10/02/18 21:00


 


O2 Sat by Pulse Oximetry (%)  98   10/02/18 21:00











Labs: 


 CBC, BMP





 10/02/18 05:30 





 10/02/18 18:00

## 2018-10-02 NOTE — CONSULT
Consult


Consult Specialty:: Nephrology


Reason for Consultation:: hypokalemia





- History of Present Illness


Chief Complaint: HTN


History of Present Illness: 





Pt is a 63 year old male with pmhx of HTN who presents to the ER with elevated 

blood pressure. He was found to be hypokalemic and I was called to evaluate 

him. He says he has had history of HTN for about 20 years. He was started on 

lisinopril/hctz a few months ago but has stopped them as he ran out. He denies 

chest pain or shortness of breath. He was found to be hypokalemic. He was given 

multiple runs of potassium and his levels are still low. He denies 

palpitations. He does have a strong family history of HTN. 





- History Source


History Provided By: Patient





- Past Medical History


Cardio/Vascular: Yes: HTN





- Alcohol/Substance Use


Hx Alcohol Use: No





- Smoking History


Smoking history: Never smoked


Have you smoked in the past 12 months: No





Home Medications





- Allergies


Allergies/Adverse Reactions: 


 Allergies











Allergy/AdvReac Type Severity Reaction Status Date / Time


 


No Known Allergies Allergy   Verified 10/01/18 10:08














- Home Medications


Home Medications: 


Ambulatory Orders





Aspirin [ASA -] 81 mg PO DAILY 10/01/18 


Lisinopril/Hydrochlorothiazide [Lisinopril-Hctz 10-12.5 mg Tab] 1 each PO BID 10

/01/18 











Family Disease History





- Family Disease History


Family Disease History: Other: Brother (htn), Sister (htn), Son (htn)





Review of Systems





- Review of Systems


Constitutional: reports: No Symptoms


Eyes: reports: No Symptoms


HENT: reports: No Symptoms


Neck: reports: No Symptoms


Cardiovascular: reports: No Symptoms


Respiratory: reports: No Symptoms


Gastrointestinal: reports: No Symptoms


Genitourinary: reports: No Symptoms


Musculoskeletal: reports: No Symptoms


Integumentary: reports: No Symptoms


Neurological: reports: No Symptoms


Endocrine: reports: No Symptoms


Hematology/Lymphatic: reports: No Symptoms


Psychiatric: reports: No Symptoms





Physical Exam


Vital Signs: 


 Vital Signs











Temperature  98.1 F   10/02/18 14:00


 


Pulse Rate  67   10/02/18 14:00


 


Respiratory Rate  18   10/02/18 09:26


 


Blood Pressure  174/96 H  10/02/18 14:00


 


O2 Sat by Pulse Oximetry (%)  99   10/02/18 04:09











Constitutional: Yes: Calm


Eyes: Yes: Conjunctiva Clear


HENT: Yes: Atraumatic


Neck: Yes: Supple


Cardiovascular: Yes: S1, S2


Respiratory: Yes: CTA Bilaterally


Gastrointestinal: Yes: Normal Bowel Sounds, Soft


Renal/: Yes: WNL


Musculoskeletal: Yes: WNL


Edema: No


Integumentary: Yes: WNL


Neurological: Yes: Oriented


Psychiatric: Yes: Oriented


Labs: 


 CBC, BMP





 10/02/18 05:30 





 10/02/18 12:48 





 Laboratory Tests











  10/01/18 10/01/18 10/02/18





  12:09 23:10 02:48


 


Potassium  2.3 L*  2.3 L* 


 


Creatinine  2.5 H  2.2 H 


 


Urine Protein    Negative


 


Urine Blood    2+ H














  10/02/18 10/02/18





  05:30 12:48


 


Potassium  2.3 L*  2.8 L*


 


Creatinine  2.1 H  2.0 H


 


Urine Protein  


 


Urine Blood  














Imaging





- Results


Chest X-ray: Report Reviewed





Problem List





- Problems


(1) Hypertensive emergency


Code(s): I16.1 - HYPERTENSIVE EMERGENCY   





(2) Hypokalemia


Code(s): E87.6 - HYPOKALEMIA   





Assessment/Plan





 Current Medications











Generic Name Dose Route Start Last Admin





  Trade Name Freq  PRN Reason Stop Dose Admin


 


Amlodipine Besylate  10 mg  10/02/18 10:00  10/02/18 09:59





  Norvasc -  PO   10 mg





  DAILY GUALBERTO   Administration





     





     





     





     


 


Potassium Chloride  10 meq in 100 mls @ 100 mls/hr  10/02/18 16:45  





  Potassium Chloride 10 Meq Premix Ivpb -  IVPB  10/02/18 19:44  





  Q60M GUALBERTO   





     





     





     





     


 


Metoprolol Succinate  25 mg  10/02/18 10:00  10/02/18 09:59





  Toprol Xl -  PO   25 mg





  DAILY GUALBERTO   Administration





     





     





     





     


 


Potassium Chloride  40 meq  10/02/18 10:00  10/02/18 14:43





  K-Dur -  PO   40 meq





  DAILY GUALBERTO   Administration





     





     





     





     





 Selected Entries











  10/01/18 10/02/18 10/02/18





  10:09 04:09 06:00


 


Blood Pressure 228/107 H 173/90 H 140/78














  10/02/18 10/02/18





  09:26 14:00


 


Blood Pressure 144/77 174/96 H











Impression


1. HTN


2. ZACK


3. possible CKD


4. microscopic hematuria


5. hypokalemia





Plan


- replace potassium


- hold diuretics


- monitor bp


- cont current meds


- check renal ultrasound


- check renin and tianna


- will need further outpt workup


- send osm and urin k to calc ttkg


- will follow


Dr Ray

## 2018-10-02 NOTE — CONS
DATE OF CONSULTATION:  10/02/2018

 

REQUESTING PHYSICIAN:  Tl Benjamin MD

 

CHIEF COMPLAINT:  Evaluation of hypertension, elevated troponin-I level.

 

A 63-year-old male of [place] descent, of  ancestry, with known history of

hypertensive cardiovascular disease, questionable history of cerebrovascular disease

with minimal right upper extremity weakness, who denied diabetes mellitus,

hypercholesterolemia, tobacco abuse, who presented to [facility]

Emergency Room after obtaining home blood pressure measurement which was noted to be

significantly elevated.  Patient stated that he has not administered his medical

therapy for management of the above-noted hypertension for several days since he ran

out of his medications.  Patient did not report any chest discomfort.  Patient denied

any dyspnea, orthopnea, paroxysmal nocturnal dyspnea, or peripheral edema.  Patient

denied any palpitation, dizziness, lightheadedness, or syncope.  Patient denied any

headaches or visual symptoms.

 

PAST MEDICAL HISTORY:  Hypertensive cardiovascular disease, questionable history of

cerebrovascular disease with right upper extremity weakness, and a urologic

procedure, details of which are not available.

 

SOCIAL HISTORY:  Denies smoking.

 

FAMILY HISTORY:  Positive for hypertensive cardiovascular disease.

 

ALLERGIES:  None reported.

 

MEDICAL THERAPY AT HOME:  Patient does not recall.

 

Medical therapy currently includes Norvasc 10 mg once a day, Toprol-XL 25 mg once a

day, and potassium supplementation.

 

REVIEW OF SYSTEMS:

Head and Neck:  Denies headache, photophobia, blurring of vision.

Respiratory:  No cough or sputum production.

Cardiovascular:  As noted above.

Gastrointestinal:  Denies nausea, vomiting, diarrhea, abdominal discomfort.

Genitourinary:  No symptoms reported.

Musculoskeletal:  No symptoms reported.

 

PHYSICAL EXAMINATION: 

Vital Signs:  Blood pressure is 144/77 mmHg, pulse rate is 78 beats per minute.

Head and Neck:  Pupils equal and reactive to light and accommodation.  Extraocular

muscles are intact.  Anicteric sclerae.  Negative JVD.  No bruit appreciated.

Chest:  Clear to auscultation and percussion.

Cardiovascular:  S1 and S2 regular.  Grade 1-2/6 systolic ejection murmur.  No clicks

or gallop.

Abdomen:  Soft, benign.  Normoactive bowel sounds.

Extremities:  Distal pulses 2+.  No calf tenderness.

 

Electrocardiogram revealed sinus rhythm with left axis deviation, poor R-wave

progression, right-sided conduction delay with nonspecific T-wave abnormality.

 

CBC revealed white cell count 6.9, hemoglobin 10.8, platelet count 208.  Basic

metabolic profile:  Sodium 144, potassium 2.3, BUN of 27, creatinine 2.1, glucose 95.

 AST 28, ALT 20.

 

ASSESSMENT:

1.  Hypertensive cardiovascular disease, labile blood pressure, uncontrolled. 

Medical therapy none administration.

2.  Coronary artery disease, angina pectoris with evidence of demand ischemia,

elevated troponin-I level at 0.20.  Probable diastolic left ventricular dysfunction

with clinical class 0 New York Heart Association classification left ventricular

failure.

3.  Heart murmur, most likely related to aortic valve sclerosis.

4.  Questionable history of cerebrovascular disease with right upper extremity

weakness.

5.  Chronic kidney disease.

6.  Anemia, etiology of which is to be determined.

7.  Hypokalemia.

 

RECOMMENDATION:

1.  Continuation of Toprol-XL therapy and titration of dosage.

2.  Continuation of Norvasc therapy.

3.  Ideally, patient should be on ACE inhibitors or angiotensin receptor blockers

unless it is absolutely contraindicated, pending renal function stabilization.

4.  Correction of hypokalemia.

5.  Echocardiography for evaluation of left ventricular size and function and the

above-noted heart murmur.

 

Thank you for the kind referral.

 

 

ANNA PERSON M.D.

 

[id number]

DD: 10/02/2018 12:53

DT: 10/02/2018 13:18

Job #:  [id number]

## 2018-10-03 LAB
ALBUMIN SERPL-MCNC: 2.8 G/DL (ref 3.4–5)
ALP SERPL-CCNC: 73 U/L (ref 45–117)
ALT SERPL-CCNC: 21 U/L (ref 13–61)
ANION GAP SERPL CALC-SCNC: 11 MMOL/L (ref 8–16)
ANION GAP SERPL CALC-SCNC: 6 MMOL/L (ref 8–16)
AST SERPL-CCNC: 29 U/L (ref 15–37)
BILIRUB SERPL-MCNC: 0.7 MG/DL (ref 0.2–1)
BUN SERPL-MCNC: 21 MG/DL (ref 7–18)
BUN SERPL-MCNC: 21 MG/DL (ref 7–18)
CALCIUM SERPL-MCNC: 8.3 MG/DL (ref 8.5–10.1)
CALCIUM SERPL-MCNC: 8.5 MG/DL (ref 8.5–10.1)
CHLORIDE SERPL-SCNC: 106 MMOL/L (ref 98–107)
CHLORIDE SERPL-SCNC: 106 MMOL/L (ref 98–107)
CO2 SERPL-SCNC: 27 MMOL/L (ref 21–32)
CO2 SERPL-SCNC: 27 MMOL/L (ref 21–32)
CREAT SERPL-MCNC: 1.8 MG/DL (ref 0.55–1.3)
CREAT SERPL-MCNC: 1.8 MG/DL (ref 0.55–1.3)
GLUCOSE SERPL-MCNC: 106 MG/DL (ref 74–106)
GLUCOSE SERPL-MCNC: 88 MG/DL (ref 74–106)
POTASSIUM SERPLBLD-SCNC: 3 MMOL/L (ref 3.5–5.1)
POTASSIUM SERPLBLD-SCNC: 3.2 MMOL/L (ref 3.5–5.1)
PROT SERPL-MCNC: 6.6 G/DL (ref 6.4–8.2)
SODIUM SERPL-SCNC: 138 MMOL/L (ref 136–145)
SODIUM SERPL-SCNC: 144 MMOL/L (ref 136–145)

## 2018-10-03 RX ADMIN — CARVEDILOL SCH MG: 6.25 TABLET, FILM COATED ORAL at 21:15

## 2018-10-03 RX ADMIN — HYDRALAZINE HYDROCHLORIDE SCH MG: 25 TABLET, FILM COATED ORAL at 13:05

## 2018-10-03 RX ADMIN — POTASSIUM CHLORIDE SCH MEQ: 1500 TABLET, EXTENDED RELEASE ORAL at 09:53

## 2018-10-03 RX ADMIN — POTASSIUM CHLORIDE SCH MLS/HR: 7.46 INJECTION, SOLUTION INTRAVENOUS at 00:19

## 2018-10-03 RX ADMIN — POTASSIUM CHLORIDE SCH MLS/HR: 7.46 INJECTION, SOLUTION INTRAVENOUS at 10:47

## 2018-10-03 RX ADMIN — HYDRALAZINE HYDROCHLORIDE SCH MG: 25 TABLET, FILM COATED ORAL at 09:54

## 2018-10-03 RX ADMIN — AMLODIPINE BESYLATE SCH MG: 10 TABLET ORAL at 09:54

## 2018-10-03 RX ADMIN — POTASSIUM CHLORIDE SCH: 7.46 INJECTION, SOLUTION INTRAVENOUS at 16:53

## 2018-10-03 RX ADMIN — POTASSIUM CHLORIDE SCH: 7.46 INJECTION, SOLUTION INTRAVENOUS at 16:55

## 2018-10-03 RX ADMIN — HYDRALAZINE HYDROCHLORIDE SCH MG: 25 TABLET, FILM COATED ORAL at 21:15

## 2018-10-03 RX ADMIN — POTASSIUM CHLORIDE SCH MLS/HR: 7.46 INJECTION, SOLUTION INTRAVENOUS at 20:27

## 2018-10-03 RX ADMIN — POTASSIUM CHLORIDE SCH MLS/HR: 7.46 INJECTION, SOLUTION INTRAVENOUS at 12:04

## 2018-10-03 RX ADMIN — POTASSIUM CHLORIDE SCH MLS/HR: 7.46 INJECTION, SOLUTION INTRAVENOUS at 01:00

## 2018-10-03 RX ADMIN — CARVEDILOL SCH MG: 6.25 TABLET, FILM COATED ORAL at 13:04

## 2018-10-03 RX ADMIN — POTASSIUM CHLORIDE SCH MLS/HR: 7.46 INJECTION, SOLUTION INTRAVENOUS at 13:05

## 2018-10-03 RX ADMIN — POTASSIUM CHLORIDE SCH MLS/HR: 7.46 INJECTION, SOLUTION INTRAVENOUS at 21:15

## 2018-10-03 RX ADMIN — POTASSIUM CHLORIDE SCH MLS/HR: 7.46 INJECTION, SOLUTION INTRAVENOUS at 23:03

## 2018-10-03 NOTE — PN
Progress Note, Physician


History of Present Illness: 





Pt seen and examined at bedside. He is awake and alert. He denies shortness of 

breath. He does have worsening of his lower ext edema. 





- Current Medication List


Current Medications: 


Active Medications





Amlodipine Besylate (Norvasc -)  10 mg PO DAILY [facility]


   Last Admin: 10/03/18 09:54 Dose:  10 mg


Hydralazine HCl (Apresoline -)  25 mg PO BID [facility]


   Last Admin: 10/03/18 09:54 Dose:  25 mg


Potassium Chloride (Potassium Chloride 10 Meq Premix Ivpb -)  10 meq in 100 mls 

@ 100 mls/hr IVPB Q60M [facility]


   Stop: 10/03/18 13:14


   Last Admin: 10/03/18 10:47 Dose:  100 mls/hr


Metoprolol Succinate (Toprol Xl -)  25 mg PO DAILY [facility]


   Last Admin: 10/03/18 09:53 Dose:  25 mg


Potassium Chloride (K-Dur -)  40 meq PO DAILY [facility]


   Last Admin: 10/03/18 09:53 Dose:  40 meq











- Objective


Vital Signs: 


 Vital Signs











Temperature  98.6 F   10/03/18 07:34


 


Pulse Rate  69   10/03/18 07:34


 


Respiratory Rate  18   10/03/18 07:40


 


Blood Pressure  174/98 H  10/03/18 07:34


 


O2 Sat by Pulse Oximetry (%)  98   10/03/18 07:40











Constitutional: Yes: Calm


Eyes: Yes: Conjunctiva Clear


HENT: Yes: Atraumatic


Neck: Yes: Supple


Cardiovascular: Yes: S1, S2


Respiratory: Yes: CTA Bilaterally


Genitourinary: Yes: WNL


Musculoskeletal: Yes: WNL


Edema: Yes


Edema: LLE: 1+, RLE: 1+


Neurological: Yes: Oriented


Psychiatric: Yes: Oriented


Labs: 


 CBC, BMP





 10/02/18 05:30 





 10/03/18 05:30 











Problem List





- Problems


(1) Hypertensive emergency


Code(s): I16.1 - HYPERTENSIVE EMERGENCY   





(2) Hypokalemia


Code(s): E87.6 - HYPOKALEMIA   





Assessment/Plan


 Current Medications











Generic Name Dose Route Start Last Admin





  Trade Name Freq  PRN Reason Stop Dose Admin


 


Amlodipine Besylate  10 mg  10/02/18 10:00  10/03/18 09:54





  Norvasc -  PO   10 mg





  DAILY [facility]   Administration





     





     





     





     


 


Hydralazine HCl  25 mg  10/02/18 21:50  10/03/18 09:54





  Apresoline -  PO   25 mg





  BID GUALBERTO   Administration





     





     





     





     


 


Potassium Chloride  10 meq in 100 mls @ 100 mls/hr  10/03/18 09:15  10/03/18 10:

47





  Potassium Chloride 10 Meq Premix Ivpb -  IVPB  10/03/18 13:14  100 mls/hr





  Q60M GUALBERTO   Administration





     





     





     





     


 


Metoprolol Succinate  25 mg  10/02/18 10:00  10/03/18 09:53





  Toprol Xl -  PO   25 mg





  DAILY GUALBERTO   Administration





     





     





     





     


 


Potassium Chloride  40 meq  10/02/18 10:00  10/03/18 09:53





  K-Dur -  PO   40 meq





  DAILY GUALBERTO   Administration





     





     





     





     





 Laboratory Tests











  10/02/18





  18:00


 


Renin Activity  Pending


 


Aldosterone  Pending














Impression


1. HTN


2. ZACK


3. possible CKD


4. microscopic hematuria


5. hypokalemia





Plan


- supplement potassium, po and IV


- hold hctz


- will give a dose of spironolactone


- renal function is improving


- increase hydralalzine dose


- monitor bp 


- follow renin and tianna level


- will follow pt


- ttkg is 3.9








Dr Ray

## 2018-10-03 NOTE — PN
Progress Note, Physician


History of Present Illness: 


Denies chest pain or dyspnea.








- Current Medication List


Current Medications: 


Active Medications





Amlodipine Besylate (Norvasc -)  10 mg PO DAILY [facility]


   Last Admin: 10/03/18 09:54 Dose:  10 mg


Hydralazine HCl (Apresoline -)  25 mg PO TID [facility]


Potassium Chloride (Potassium Chloride 10 Meq Premix Ivpb -)  10 meq in 100 mls 

@ 100 mls/hr IVPB Q60M [facility]


   Stop: 10/03/18 13:14


   Last Admin: 10/03/18 10:47 Dose:  100 mls/hr


Metoprolol Succinate (Toprol Xl -)  25 mg PO DAILY [facility]


   Last Admin: 10/03/18 09:53 Dose:  25 mg


Potassium Chloride (K-Dur -)  40 meq PO DAILY [facility]


   Last Admin: 10/03/18 09:53 Dose:  40 meq


Spironolactone (Aldactone -)  25 mg PO ONCE ONE


   Stop: 10/03/18 11:16











- Objective


Vital Signs: 


 Vital Signs











Temperature  98.6 F   10/03/18 07:34


 


Pulse Rate  69   10/03/18 07:34


 


Respiratory Rate  18   10/03/18 07:40


 


Blood Pressure  174/98 H  10/03/18 07:34


 


O2 Sat by Pulse Oximetry (%)  98   10/03/18 07:40











Constitutional: Yes: No Distress, Calm


Neck: Yes: Supple


Cardiovascular: Yes: Regular Rate and Rhythm


Respiratory: Yes: Regular, CTA Bilaterally


Gastrointestinal: Yes: Normal Bowel Sounds, Soft


Edema: No


Labs: 


 CBC, BMP





 10/02/18 05:30 





 10/03/18 05:30 











- ....Imaging


Ultrasound: Report Reviewed (Renal U/S: No hydro)


EKG: Report Reviewed (Tele: NSR)





Problem List





- Problems


(1) Hypertensive urgency


Code(s): I16.0 - HYPERTENSIVE URGENCY   





(2) Demand ischemia


Code(s): I24.8 - OTHER FORMS OF ACUTE ISCHEMIC HEART DISEASE   





(3) Acute-on-chronic kidney injury


Code(s): N17.9 - ACUTE KIDNEY FAILURE, UNSPECIFIED; N18.9 - CHRONIC KIDNEY 

DISEASE, UNSPECIFIED   


Qualifiers: 


   Acute renal failure type: unspecified 





(4) Hypokalemia


Code(s): E87.6 - HYPOKALEMIA   





Assessment/Plan


ASSESSMENT:


Echo: 10/02/2018 Normal LV size with mod cLVH, hyperdynamic LVEF 70%, mild LAE, 

mild AR, TR





1. Hypertensive cardiovascular disease, labile blood pressure uncontrolled, 

medical therapy non administration


2. CAD angina pectoris with evidence of demand ischemia


3. Diastolic LV dysfunction with class 0 NYHA classification LV failure


4. Acute on CKD


5. Anemia, etiology to be determined


6. Hypokalemia





PLAN:





1. Change Toprol XL to carvedilol and uptitrate as tolerated


2. Change Norvasc to Procardia XL, hydralazine and aldactone added


3. Ideally should be on ACEI or ARBS unless contraindicated, pending renal 

function stabilization


4. Correction of Hypokalemia

## 2018-10-03 NOTE — PN
Progress Note, Physician





- Current Medication List


Current Medications: 


Active Medications





Carvedilol (Coreg -)  6.25 mg PO BID [facility]


   Last Admin: 10/03/18 13:04 Dose:  6.25 mg


Hydralazine HCl (Apresoline -)  25 mg PO TID [facility]


   Last Admin: 10/03/18 13:05 Dose:  25 mg


Potassium Chloride (Potassium Chloride 10 Meq Premix Ivpb -)  10 meq in 100 mls 

@ 100 mls/hr IVPB Q60M [facility]


   Stop: 10/03/18 22:59


Nifedipine (Procardia Xl -)  60 mg PO DAILY [facility]


Potassium Chloride (K-Dur -)  40 meq PO DAILY [facility]


   Last Admin: 10/03/18 09:53 Dose:  40 meq











- Objective


Vital Signs: 


 Vital Signs











Temperature  98.4 F   10/03/18 17:00


 


Pulse Rate  69   10/03/18 17:00


 


Respiratory Rate  20   10/03/18 17:00


 


Blood Pressure  167/101 H  10/03/18 17:00


 


O2 Sat by Pulse Oximetry (%)  98   10/03/18 07:40











Labs: 


 CBC, BMP





 10/02/18 05:30 





 10/03/18 16:55

## 2018-10-04 VITALS — DIASTOLIC BLOOD PRESSURE: 86 MMHG | TEMPERATURE: 98.5 F | SYSTOLIC BLOOD PRESSURE: 151 MMHG | HEART RATE: 67 BPM

## 2018-10-04 LAB
ALBUMIN SERPL-MCNC: 2.8 G/DL (ref 3.4–5)
ALP SERPL-CCNC: 72 U/L (ref 45–117)
ALT SERPL-CCNC: 20 U/L (ref 13–61)
ANION GAP SERPL CALC-SCNC: 6 MMOL/L (ref 8–16)
AST SERPL-CCNC: 28 U/L (ref 15–37)
BILIRUB SERPL-MCNC: 0.6 MG/DL (ref 0.2–1)
BUN SERPL-MCNC: 25 MG/DL (ref 7–18)
CALCIUM SERPL-MCNC: 8.9 MG/DL (ref 8.5–10.1)
CHLORIDE SERPL-SCNC: 106 MMOL/L (ref 98–107)
CO2 SERPL-SCNC: 28 MMOL/L (ref 21–32)
CREAT SERPL-MCNC: 1.9 MG/DL (ref 0.55–1.3)
GLUCOSE SERPL-MCNC: 91 MG/DL (ref 74–106)
POTASSIUM SERPLBLD-SCNC: 3.5 MMOL/L (ref 3.5–5.1)
PROT SERPL-MCNC: 6.9 G/DL (ref 6.4–8.2)
SODIUM SERPL-SCNC: 141 MMOL/L (ref 136–145)

## 2018-10-04 RX ADMIN — HYDRALAZINE HYDROCHLORIDE SCH MG: 25 TABLET, FILM COATED ORAL at 05:43

## 2018-10-04 RX ADMIN — POTASSIUM CHLORIDE SCH MEQ: 1500 TABLET, EXTENDED RELEASE ORAL at 11:11

## 2018-10-04 RX ADMIN — CARVEDILOL SCH MG: 6.25 TABLET, FILM COATED ORAL at 11:11

## 2018-10-04 NOTE — DS
Physical Examination


Vital Signs: 


 Vital Signs











Temperature  98.4 F   10/04/18 13:49


 


Pulse Rate  72   10/04/18 13:49


 


Respiratory Rate  20   10/04/18 13:49


 


Blood Pressure  121/75   10/04/18 13:49


 


O2 Sat by Pulse Oximetry (%)  97   10/03/18 21:00











Constitutional: Yes: No Distress


HENT: Yes: Atraumatic


Neck: Yes: Supple


Cardiovascular: Yes: Regular Rate and Rhythm


Respiratory: Yes: CTA Bilaterally


Gastrointestinal: Yes: Normal Bowel Sounds


Extremities: Yes: WNL


Edema: Yes


Peripheral Pulses WNL: Yes


Neurological: Yes: Alert, Oriented


Labs: 


 CBC, BMP





 10/02/18 05:30 





 10/04/18 07:05 











Discharge Summary


Reason For Visit: HYPERTENSIVE EMERGENCY


Current Active Problems





Acute-on-chronic kidney injury (Acute)


Demand ischemia (Acute)


Elevated serum creatinine (Acute)


Hypertensive emergency (Acute)


Hypertensive urgency (Acute)


Hypokalemia (Acute)


NSTEMI (non-ST elevated myocardial infarction) (Acute)








Condition: Fair





- Instructions


Diet, Activity, Other Instructions: 


see your nephrologist and clinic in 2-3 days


follow up potassium level





- Home Medications


Comprehensive Discharge Medication List: 


Ambulatory Orders





Aspirin [ASA -] 81 mg PO DAILY 10/01/18 


Carvedilol [Coreg -] 12.5 mg PO BID #60 tablet 10/04/18 


Nifedipine ER [Procardia XL -] 60 mg PO DAILY #30 tab.er.24 10/04/18

## 2018-10-04 NOTE — PN
Progress Note, Physician


History of Present Illness: 





Pt seen and examined at bedside. He is awake and alert. He denies chest pain or 

shortness of breath. 





- Current Medication List


Current Medications: 


Active Medications





Carvedilol (Coreg -)  12.5 mg PO BID [facility]


Nifedipine (Procardia Xl -)  60 mg PO DAILY [facility]


   Last Admin: 10/04/18 11:11 Dose:  60 mg


Potassium Chloride (K-Dur -)  40 meq PO DAILY [facility]


   Last Admin: 10/04/18 11:11 Dose:  40 meq











- Objective


Vital Signs: 


 Vital Signs











Temperature  98.4 F   10/04/18 13:49


 


Pulse Rate  72   10/04/18 13:49


 


Respiratory Rate  20   10/04/18 13:49


 


Blood Pressure  121/75   10/04/18 13:49


 


O2 Sat by Pulse Oximetry (%)  97   10/03/18 21:00











Constitutional: Yes: Calm


Eyes: Yes: Conjunctiva Clear


HENT: Yes: Atraumatic


Neck: Yes: Supple


Cardiovascular: Yes: S1, S2


Respiratory: Yes: CTA Bilaterally


Gastrointestinal: Yes: Normal Bowel Sounds, Soft


Genitourinary: Yes: WNL


Musculoskeletal: Yes: WNL


Edema: No


Neurological: Yes: Oriented


Psychiatric: Yes: Oriented


Labs: 


 CBC, BMP





 10/02/18 05:30 





 10/04/18 07:05 











Problem List





- Problems


(1) Hypertensive emergency


Code(s): I16.1 - HYPERTENSIVE EMERGENCY   





(2) Hypokalemia


Code(s): E87.6 - HYPOKALEMIA   





Assessment/Plan


 Current Medications











Generic Name Dose Route Start Last Admin





  Trade Name Freq  PRN Reason Stop Dose Admin


 


Carvedilol  12.5 mg  10/04/18 11:20  





  Coreg -  PO   





  BID GUALBERTO   





     





     





     





     


 


Nifedipine  60 mg  10/04/18 10:00  10/04/18 11:11





  Procardia Xl -  PO   60 mg





  DAILY GUALBERTO   Administration





     





     





     





     


 


Potassium Chloride  40 meq  10/02/18 10:00  10/04/18 11:11





  K-Dur -  PO   40 meq





  DAILY GUALBERTO   Administration





     





     





     





     








 Laboratory Tests











  10/02/18





  18:00


 


Renin Activity  Pending


 


Aldosterone  Pending














Impression


1. HTN


2. ZACK


3. possible CKD


4. microscopic hematuria


5. hypokalemia





Plan


- bp is stabilizing


- cont coreg and procardia


- hydralazine stopped


- monitor renal function


- repeat labs in am


- follow renin and tianna level


- will follow pt


- would not restart thiazide


- will need full outpt renal workup





Dr Ray

## 2018-10-04 NOTE — PN
Progress Note, Physician


History of Present Illness: 





doing well





- Current Medication List


Current Medications: 


Active Medications





Carvedilol (Coreg -)  12.5 mg PO BID [facility]


Nifedipine (Procardia Xl -)  60 mg PO DAILY [facility]


   Last Admin: 10/04/18 11:11 Dose:  60 mg


Potassium Chloride (K-Dur -)  40 meq PO DAILY [facility]


   Last Admin: 10/04/18 11:11 Dose:  40 meq











- Objective


Vital Signs: 


 Vital Signs











Temperature  98.4 F   10/04/18 13:49


 


Pulse Rate  72   10/04/18 13:49


 


Respiratory Rate  20   10/04/18 13:49


 


Blood Pressure  121/75   10/04/18 13:49


 


O2 Sat by Pulse Oximetry (%)  97   10/03/18 21:00











Constitutional: Yes: No Distress


HENT: Yes: Atraumatic


Neck: Yes: Supple


Cardiovascular: Yes: Regular Rate and Rhythm


Respiratory: Yes: CTA Bilaterally


Gastrointestinal: Yes: Normal Bowel Sounds


Extremities: Yes: WNL


Neurological: Yes: Alert, Oriented


Labs: 


 CBC, BMP





 10/02/18 05:30 





 10/04/18 07:05 











Problem List





- Problems


(1) Acute-on-chronic kidney injury


Assessment/Plan: 


cr little improved


renal on board


out patient renal work up


Code(s): N17.9 - ACUTE KIDNEY FAILURE, UNSPECIFIED; N18.9 - CHRONIC KIDNEY 

DISEASE, UNSPECIFIED   


Qualifiers: 


   Acute renal failure type: unspecified 





(2) Demand ischemia


Code(s): I24.8 - OTHER FORMS OF ACUTE ISCHEMIC HEART DISEASE   





(3) Elevated serum creatinine


Code(s): R79.89 - OTHER SPECIFIED ABNORMAL FINDINGS OF BLOOD CHEMISTRY   





(4) Hypertensive urgency


Assessment/Plan: 


bp controlled





Code(s): I16.0 - HYPERTENSIVE URGENCY

## 2018-10-04 NOTE — PN
Progress Note, Physician


History of Present Illness: 


Denies chest pain or dyspnea.








- Current Medication List


Current Medications: 


Active Medications





Carvedilol (Coreg -)  6.25 mg [facility] BID [facility]


   Last Admin: 10/03/18 21:15 Dose:  6.25 mg


Hydralazine HCl (Apresoline -)  25 mg PO TID [facility]


   Last Admin: 10/04/18 05:43 Dose:  25 mg


Nifedipine (Procardia Xl -)  60 mg PO DAILY [facility]


Potassium Chloride (K-Dur -)  40 meq PO DAILY [facility]


   Last Admin: 10/03/18 09:53 Dose:  40 meq











- Objective


Vital Signs: 


 Vital Signs











Temperature  98.3 F   10/04/18 01:55


 


Pulse Rate  64   10/04/18 01:55


 


Respiratory Rate  18   10/04/18 01:55


 


Blood Pressure  161/95   10/04/18 01:55


 


O2 Sat by Pulse Oximetry (%)  97   10/03/18 21:00











Constitutional: Yes: No Distress, Calm, Thin


Neck: Yes: Supple


Cardiovascular: Yes: Regular Rate and Rhythm


Respiratory: Yes: Regular, CTA Bilaterally


Gastrointestinal: Yes: Normal Bowel Sounds, Soft


Edema: No


Labs: 


 CBC, BMP





 10/02/18 05:30 





 10/04/18 07:05 











- ....Imaging


EKG: Report Reviewed (Tele: NSR)





Problem List





- Problems


(1) Hypertensive urgency


Code(s): I16.0 - HYPERTENSIVE URGENCY   





(2) Demand ischemia


Code(s): I24.8 - OTHER FORMS OF ACUTE ISCHEMIC HEART DISEASE   





(3) Acute-on-chronic kidney injury


Code(s): N17.9 - ACUTE KIDNEY FAILURE, UNSPECIFIED; N18.9 - CHRONIC KIDNEY 

DISEASE, UNSPECIFIED   


Qualifiers: 


   Acute renal failure type: unspecified 





(4) Hypokalemia


Code(s): E87.6 - HYPOKALEMIA   





Assessment/Plan


ASSESSMENT:


Echo: 10/02/2018 Normal LV size with mod cLVH, hyperdynamic LVEF 70%, mild LAE, 

mild AR, TR





1. Hypertensive cardiovascular disease, labile blood pressure uncontrolled, 

medical therapy non administration


2. CAD angina pectoris with evidence of demand ischemia


3. Diastolic LV dysfunction with class 0 NYHA classification LV failure


4. Acute on CKD


5. Anemia, etiology to be determined


6. Hypokalemia improved





PLAN:





1. Increase carvedilol 12.5 bid and uptitrate as tolerated


2. Continue Procardia XL 60 qd, would d/c hydralazine given dose frequency


3. Ideally should be on ACEI or ARBS unless contraindicated, pending renal 

function stabilization


4. Correction of Hypokalemia


5. D/c planning

## 2018-10-06 LAB — RENIN PLAS-CCNC: < 0.167 NG/ML/HR (ref 0.17–5.38)

## 2019-01-30 ENCOUNTER — HOSPITAL ENCOUNTER (INPATIENT)
Dept: HOSPITAL 74 - JER | Age: 64
LOS: 3 days | Discharge: HOME | DRG: 469 | End: 2019-02-02
Attending: INTERNAL MEDICINE | Admitting: INTERNAL MEDICINE
Payer: COMMERCIAL

## 2019-01-30 VITALS — BODY MASS INDEX: 30.9 KG/M2

## 2019-01-30 DIAGNOSIS — I67.9: ICD-10-CM

## 2019-01-30 DIAGNOSIS — I24.8: ICD-10-CM

## 2019-01-30 DIAGNOSIS — I25.10: ICD-10-CM

## 2019-01-30 DIAGNOSIS — Z86.73: ICD-10-CM

## 2019-01-30 DIAGNOSIS — I16.1: ICD-10-CM

## 2019-01-30 DIAGNOSIS — E87.6: ICD-10-CM

## 2019-01-30 DIAGNOSIS — I13.10: ICD-10-CM

## 2019-01-30 DIAGNOSIS — N17.9: Primary | ICD-10-CM

## 2019-01-30 DIAGNOSIS — N18.9: ICD-10-CM

## 2019-01-30 LAB
ALBUMIN SERPL-MCNC: 3.4 G/DL (ref 3.4–5)
ALP SERPL-CCNC: 86 U/L (ref 45–117)
ALT SERPL-CCNC: 18 U/L (ref 13–61)
ANION GAP SERPL CALC-SCNC: 8 MMOL/L (ref 8–16)
AST SERPL-CCNC: 17 U/L (ref 15–37)
BASOPHILS # BLD: 0.7 % (ref 0–2)
BILIRUB SERPL-MCNC: 0.5 MG/DL (ref 0.2–1)
BUN SERPL-MCNC: 19 MG/DL (ref 7–18)
CALCIUM SERPL-MCNC: 9.2 MG/DL (ref 8.5–10.1)
CHLORIDE SERPL-SCNC: 106 MMOL/L (ref 98–107)
CO2 SERPL-SCNC: 30 MMOL/L (ref 21–32)
CREAT SERPL-MCNC: 2.1 MG/DL (ref 0.55–1.3)
DEPRECATED RDW RBC AUTO: 17.4 % (ref 11.9–15.9)
EOSINOPHIL # BLD: 3.6 % (ref 0–4.5)
GLUCOSE SERPL-MCNC: 95 MG/DL (ref 74–106)
HCT VFR BLD CALC: 38.5 % (ref 35.4–49)
HGB BLD-MCNC: 12.9 GM/DL (ref 11.7–16.9)
INR BLD: 1.03 (ref 0.83–1.09)
LYMPHOCYTES # BLD: 18.2 % (ref 8–40)
MAGNESIUM SERPL-MCNC: 2.3 MG/DL (ref 1.8–2.4)
MCH RBC QN AUTO: 25.4 PG (ref 25.7–33.7)
MCHC RBC AUTO-ENTMCNC: 33.3 G/DL (ref 32–35.9)
MCV RBC: 76.1 FL (ref 80–96)
MONOCYTES # BLD AUTO: 6.1 % (ref 3.8–10.2)
NEUTROPHILS # BLD: 71.4 % (ref 42.8–82.8)
PLATELET # BLD AUTO: 178 K/MM3 (ref 134–434)
PMV BLD: 8.6 FL (ref 7.5–11.1)
POTASSIUM SERPLBLD-SCNC: 3.5 MMOL/L (ref 3.5–5.1)
PROT SERPL-MCNC: 7.6 G/DL (ref 6.4–8.2)
PT PNL PPP: 12.2 SEC (ref 9.7–13)
RBC # BLD AUTO: 5.06 M/MM3 (ref 4–5.6)
SODIUM SERPL-SCNC: 144 MMOL/L (ref 136–145)
WBC # BLD AUTO: 6.7 K/MM3 (ref 4–10)

## 2019-01-30 PROCEDURE — G0378 HOSPITAL OBSERVATION PER HR: HCPCS

## 2019-01-30 RX ADMIN — CARVEDILOL SCH MG: 12.5 TABLET, FILM COATED ORAL at 22:24

## 2019-01-30 NOTE — PDOC
History of Present Illness





- General


History Source: Patient


Exam Limitations: No Limitations





- History of Present Illness


Initial Comments: 





01/30/19 12:57


The patient is a 67 year old male, with a significant past medical history of 

hypertension, who presents to the emergency department sent by Dr. Lucy Spivey from [facility] for evaluation of elevated BP (204/81) this morning 

despite taking his two MEENU medications. He states he noticed his MEENU is elevated 

in the evenings, however, noticed his BP was high even after taking his 

medications. The patients BP at this time is 204/116 on the left and 199/110 

on the left. 





The patient denies chest pain, shortness of breath, headache and dizziness. The 

patient denies fever, chills, nausea, vomit, diarrhea and constipation. The 

patient denies dysuria, frequency, urgency and hematuria. 





Allergies: NKDA











ADULT ROS





CONSTITUTIONAL:


Absent: fever, no chills, no fatigue


EYES:


Absent: visual changes


ENT:


Absent: ear pain, no sore throat


CARDIOVASCULAR:


(+) elevated BP. Absent: chest pain, no palpitations


RESPIRATORY:


Absent: cough, no SOB


GASTROINTESTINAL:


Absent: abdominal pain, no nausea, no vomiting, no constipation, no diarrhea


GENITOURINARY:


Absent: dysuria, no frequency, no hematuria


MUSCULOSKELETAL:


Absent: back pain, no arthralgia, no myalgia


SKIN:


Absent: rash


NEURO:


Absent: headache








PE:


GENERAL: The patient is in no acute distress.


HEAD: Normal with no signs of trauma.


EYES: PERRLA, EOMI, sclera anicteric, conjunctiva clear.


ENT: Ears normal, nares patent, oropharynx clear without exudates. Moist mucous 

membranes.


NECK: Normal range of motion, supple without lymphadenopathy, JVD, or masses.


LUNGS: Breath sounds equal, clear to auscultation bilaterally. No wheezes, and 

no crackles.


HEART: (+) systolic murmur. Regular rate and rhythm, normal S1 and S2 without 

rub or gallop.


ABDOMEN: Soft, nontender, normoactive bowel sounds. No guarding, no rebound. No 

masses palpable.


EXTREMITIES: Normal range of motion, no edema. No clubbing or cyanosis. No 

erythema, or tenderness.


NEUROLOGICAL: Cranial nerves II through XII grossly intact. Normal speech. No 

focal neurological deficits.


MUSCULOSKELETAL: Back non-tender to palpation, no CVA tenderness


SKIN: Warm, Dry, normal turgor, no rashes or lesions noted.








<Noemí Owens - Last Filed: 01/30/19 14:28>





<Niya Henry - Last Filed: 01/30/19 15:31>





- General


Chief Complaint: Blood Pressure Problem


Stated Complaint: Blood Pressure Problem


Time Seen by Provider: 01/30/19 11:24





Past History





- Past Medical History


Anemia: No


Asthma: No


Cancer: No


Cardiac Disorders: No


CVA: No


COPD: No


CHF: No


Dementia: No


Diabetes: No


GI Disorders: No


 Disorders: No


HTN: Yes


Hypercholesterolemia: No


Liver Disease: No


Seizures: No


Thyroid Disease: No





- Surgical History


Abdominal Surgery: No


Appendectomy: No


Cardiac Surgery: No


Cholecystectomy: No


Lung Surgery: No


Neurologic Surgery: No


Orthopedic Surgery: No





- Immunization History


Immunization Up to Date: Yes





- Suicide/Smoking/Psychosocial Hx


Smoking History: Never smoked


Have you smoked in the past 12 months: No


Information on smoking cessation initiated: No


Hx Alcohol Use: No


Drug/Substance Use Hx: No


Substance Use Type: None


Hx Substance Use Treatment: No





<Noemí Owens - Last Filed: 01/30/19 14:28>





<Niya Henry - Last Filed: 01/30/19 15:31>





- Past Medical History


Allergies/Adverse Reactions: 


 Allergies











Allergy/AdvReac Type Severity Reaction Status Date / Time


 


No Known Allergies Allergy   Verified 01/30/19 11:16











Home Medications: 


Ambulatory Orders





Carvedilol [Coreg -] 12.5 mg PO BID #60 tablet 10/04/18 


Nifedipine ER [Procardia XL -] 60 mg PO DAILY #30 tab.er.24 10/04/18 











*Physical Exam





- Vital Signs


 Last Vital Signs











Temp Pulse Resp BP Pulse Ox


 


 98.7 F   97 H  16   199/111 H  96 


 


 01/30/19 11:05  01/30/19 11:05  01/30/19 11:05  01/30/19 11:05  01/30/19 11:05














<Noemí Owens - Last Filed: 01/30/19 14:28>





- Vital Signs


 Last Vital Signs











Temp Pulse Resp BP Pulse Ox


 


 98.7 F   78   20   199/104 H  99 


 


 01/30/19 11:05  01/30/19 12:30  01/30/19 12:30  01/30/19 12:30  01/30/19 12:30














<Niya Henry - Last Filed: 01/30/19 15:31>





Moderate Sedation





- Procedure Monitoring


Vital Signs: 


Procedure Monitoring Vital Signs











Temperature  98.7 F   01/30/19 11:05


 


Pulse Rate  97 H  01/30/19 11:05


 


Respiratory Rate  16   01/30/19 11:05


 


Blood Pressure  199/111 H  01/30/19 11:05


 


O2 Sat by Pulse Oximetry (%)  96   01/30/19 11:05











<Noemí Owens - Last Filed: 01/30/19 14:28>





- Procedure Monitoring


Vital Signs: 


Procedure Monitoring Vital Signs











Temperature  98.7 F   01/30/19 11:05


 


Pulse Rate  78   01/30/19 12:30


 


Respiratory Rate  20   01/30/19 12:30


 


Blood Pressure  199/104 H  01/30/19 12:30


 


O2 Sat by Pulse Oximetry (%)  99   01/30/19 12:30











<Niya Henry - Last Filed: 01/30/19 15:31>





ED Treatment Course





- LABORATORY


CBC & Chemistry Diagram: 


 01/30/19 12:06





 01/30/19 12:06





<Noemí Owens - Last Filed: 01/30/19 14:28>





- LABORATORY


CBC & Chemistry Diagram: 


 01/30/19 12:06





 01/30/19 12:06





- ADDITIONAL ORDERS


Additional order review: 


 Laboratory  Results











  01/30/19 01/30/19





  12:06 12:06


 


PT with INR   12.20


 


INR   1.03


 


Sodium  144 


 


Potassium  3.5 


 


Chloride  106 


 


Carbon Dioxide  30 


 


Anion Gap  8 


 


BUN  19 H 


 


Creatinine  2.1 H 


 


Creat Clearance w eGFR  31.96 


 


Random Glucose  95 


 


Calcium  9.2 


 


Magnesium  2.3 


 


Total Bilirubin  0.5 


 


AST  17 


 


ALT  18 


 


Alkaline Phosphatase  86 


 


Creatine Kinase  224 


 


Creatine Kinase Index  0.9 


 


CK-MB (CK-2)  2.1 


 


Troponin I  0.11 H 


 


Total Protein  7.6 


 


Albumin  3.4 








 











  01/30/19





  12:06


 


RBC  5.06


 


MCV  76.1 L


 


MCHC  33.3


 


RDW  17.4 H


 


MPV  8.6


 


Neutrophils %  71.4


 


Lymphocytes %  18.2  D


 


Monocytes %  6.1


 


Eosinophils %  3.6


 


Basophils %  0.7














- RADIOLOGY


Radiograph Interpretation: 





01/30/19 15:31


 


ACCESSION #: 


[id number] 


 


EXAM#:     TYPE/EXAM: RESULT: 


[id number] CT/HEAD CT WITHOUT CONTRAST 


Cranial CT without contrast 





 IMPRESSION: 


 


 No definite CT evidence of acute intracranial pathology. 


 


 Small chronic left thalamic infarct. 


 


 A small left frontal subcortical white matter infarct is noted which is 

probably chronic. 


 


 Mild periventricular chronic microvascular ischemic changes. 


 


 


 Reported By: Angel Escobar MD 


 01/30/19 1517 





- Medications


Given in the ED: 


ED Medications














Discontinued Medications














Generic Name Dose Route Start Last Admin





  Trade Name Janeen  PRN Reason Stop Dose Admin


 


Labetalol HCl  10 mg  01/30/19 11:55  01/30/19 12:30





  Normodyne Injection -  IVPUSH  01/30/19 11:56  10 mg





  ONCE ONE   Administration





     





     





     





     














<Niya Henry - Last Filed: 01/30/19 15:31>





Medical Decision Making





- Medical Decision Making





01/30/19 12:57


62 yo M presenting with elevated BP from his pmds office


Pt has NO complaints


Did mention left sided headache yesterday


Pt denies chest pain, palpitations, shortness of breath


Pt has been compliant with his medications











EKG:


NSR rate of 89 bpm, LAD, no ST elevation or depression, t wave inversion aVL 

and I, t waves upright








01/30/19 13:22


 Laboratory Tests











  10/04/18 01/30/19 01/30/19





  07:05 12:06 12:06


 


WBC   6.7 


 


Hgb   12.9 


 


Hct   38.5 


 


Plt Count   178 


 


INR    1.03


 


BUN  25 H  


 


Creatinine  1.9 H  


 


Creatine Kinase   


 


Troponin I   














  01/30/19





  12:06


 


WBC 


 


Hgb 


 


Hct 


 


Plt Count 


 


INR 


 


BUN  19 H


 


Creatinine  2.1 H


 


Creatine Kinase  224


 


Troponin I  0.11 H











01/30/19 13:22


No chest pain


Awaiting CT


Will place on observation





<Noemí Owens - Last Filed: 01/30/19 14:28>





*DC/Admit/Observation/Transfer





- Discharge Dispostion


Decision to Admit order: Yes





<Noemí Owens - Last Filed: 01/30/19 14:28>





<Niya Henry - Last Filed: 01/30/19 15:31>


Diagnosis at time of Disposition: 


 Hypertensive emergency








- Discharge Dispostion


Condition at time of disposition: Stable

## 2019-01-30 NOTE — HP
CHIEF COMPLAINT: hypertension





PCP:  Dr. Lucy Spivey





HISTORY OF PRESENT ILLNESS: Patient is a 62 yo M wth a PMH of HTN, possible CKD

, was sent by his PCP because of elevated BP of 204/81 in the office. Patient 

is asymptomatic with no complaints. He said he took his HTN meds this AM. He 

takes Coreg and Nifedipine. He was admitted in October for a similar 

presentation. At that time he was worked up by cardiology and was told to 

follow up outpatient. Patient says he has not followed up with cardiology or 

nephro since last admission. He currently denies headache, dizziness, nausea, 

vomiting, sob, chest pain, bloody stools, diarrhea. 








ER course was notable for:


(1) 199/111


(2) Head CT neg


(3) Trop 0.11








Recent Travel: n/a





PAST MEDICAL HISTORY: per hpi





PAST SURGICAL HISTORY: n/a








Family History:


Allergies





No Known Allergies Allergy (Verified 01/30/19 11:16)


 








HOME MEDICATIONS:


 Home Medications











 Medication  Instructions  Recorded


 


Carvedilol [Coreg -] 12.5 mg PO BID #60 tablet 10/04/18


 


Nifedipine ER [Procardia XL -] 60 mg PO DAILY #30 tab.er.24 10/04/18








REVIEW OF SYSTEMS


CONSTITUTIONAL: 


Absent:  fever, chills, diaphoresis, generalized weakness, malaise, loss of 

appetite, weight change


HEENT: 


Absent:  rhinorrhea, nasal congestion, throat pain, throat swelling, difficulty 

swallowing, mouth swelling, ear pain, eye pain, visual changes


CARDIOVASCULAR: 


Absent: chest pain, syncope, palpitations, irregular heart rate, lightheadedness

, peripheral edema


RESPIRATORY: 


Absent: cough, shortness of breath, dyspnea with exertion, orthopnea, wheezing, 

stridor, hemoptysis


GASTROINTESTINAL:


Absent: abdominal pain, abdominal distension, nausea, vomiting, diarrhea, 

constipation, melena, hematochezia


GENITOURINARY: 


Absent: dysuria, frequency, urgency, hesitancy, hematuria, flank pain, genital 

pain


MUSCULOSKELETAL: 


Absent: myalgia, arthralgia, joint swelling, back pain, neck pain


SKIN: 


Absent: rash, itching, pallor


HEMATOLOGIC/IMMUNOLOGIC: 


Absent: easy bleeding, easy bruising, lymphadenopathy, frequent infections


ENDOCRINE:


Absent: unexplained weight gain, unexplained weight loss, heat intolerance, 

cold intolerance


NEUROLOGIC: 


Absent: headache, focal weakness or paresthesias, dizziness, unsteady gait, 

seizure, mental status changes, bladder or bowel incontinence


PSYCHIATRIC: 


Absent: anxiety, depression, suicidal or homicidal ideation, hallucinations.








PHYSICAL EXAMINATION


 Vital Signs - 24 hr











  01/30/19 01/30/19





  11:05 12:30


 


Temperature 98.7 F 


 


Pulse Rate 97 H 


 


Pulse Rate [  78





Radial]  


 


Respiratory 16 20





Rate  


 


Blood Pressure 199/111 H 


 


Blood Pressure  199/104 H





[Left Arm]  


 


O2 Sat by Pulse 96 99





Oximetry (%)  











GENERAL: Awake, alert, and fully oriented, in no acute distress.


HEAD: Normal with no signs of trauma.


EYES: Pupils equal, round and reactive to light, extraocular movements intact, 

sclera anicteric, conjunctiva clear.


EARS, NOSE, THROAT: oropharynx clear without exudates. Moist mucous membranes.


NECK: Normal range of motion, supple without lymphadenopathy, JVD, or masses.


LUNGS: Breath sounds equal, clear to auscultation bilaterally. No wheezes, and 

no crackles.


HEART: Regular rate and rhythm, normal S1 and S2 . + S murmur JUNG


ABDOMEN: Soft, nontender, not distended, normoactive bowel sounds, no guarding, 

no rebound, no masses.  No hepatomegaly or  splenomegaly. 


MUSCULOSKELETAL: Normal range of motion at all joints. No bony deformities or 

tenderness. No CVA tenderness.


UPPER EXTREMITIES: 2+ pulses, warm,  No peripheral edema.


LOWER EXTREMITIES: 2+ pulses,  No peripheral edema. 


NEUROLOGICAL:  Cranial nerves II-XII intact. Normal speech. Normal gait.





 Laboratory Results - last 24 hr











  01/30/19 01/30/19 01/30/19





  12:06 12:06 12:06


 


WBC  6.7  


 


RBC  5.06  


 


Hgb  12.9  


 


Hct  38.5  


 


MCV  76.1 L  


 


MCH  25.4 L  


 


MCHC  33.3  


 


RDW  17.4 H  


 


Plt Count  178  


 


MPV  8.6  


 


Absolute Neuts (auto)  4.8  


 


Neutrophils %  71.4  


 


Lymphocytes %  18.2  D  


 


Monocytes %  6.1  


 


Eosinophils %  3.6  


 


Basophils %  0.7  


 


Nucleated RBC %  0  


 


PT with INR   12.20 


 


INR   1.03 


 


Sodium    144


 


Potassium    3.5


 


Chloride    106


 


Carbon Dioxide    30


 


Anion Gap    8


 


BUN    19 H


 


Creatinine    2.1 H


 


Creat Clearance w eGFR    31.96


 


Random Glucose    95


 


Calcium    9.2


 


Magnesium    2.3


 


Total Bilirubin    0.5


 


AST    17


 


ALT    18


 


Alkaline Phosphatase    86


 


Creatine Kinase    224


 


Creatine Kinase Index    0.9


 


CK-MB (CK-2)    2.1


 


Troponin I    0.11 H


 


Total Protein    7.6


 


Albumin    3.4











ASSESSMENT/PLAN:





62 yo M wth a PMH of HTN, possible CKD, was sent by his PCP because of elevated 

BP of 204/81 in the office.











#HTN emergency


-/11 in ED


-Labetolol IV 1x in ED


-asympomatic


-trop 0.11


-repeat trop


-Give 1x 10mg IV labetolol. 


-monitor BP


-resume Home BP meds


-Echo 10/18 unremarkable. EF 70%


-EKG: NSR rate of 89 bpm, LAD, no ST elevation or depression, t wave inversion 

aVL and I, t waves upright


-tele


-Discussed case with cardio Dr. Sanchez over the phone. Agrees with plan to 

repeat trop and control BP. 








#Mildy Elevated Trops


-0.11 first set


-asymptomatic


-likely demand


-repeat trop








#ZACK


-possible CKD


-cr likely at baseline


-avoid nephrotoxins





#FEN


-NS @ 75


-monitor


-sodium diet








Tele obs. 























Visit type





- Emergency Visit


Emergency Visit: Yes


ED Registration Date: 01/31/19


Care time: The patient presented to the Emergency Department on the above date 

and was hospitalized for further evaluation of their emergent condition.





- New Patient


This patient is new to me today: Yes


Date on this admission: 02/01/19





- Critical Care


Critical Care patient: No

## 2019-01-30 NOTE — EKG
Test Reason : 

Blood Pressure : ***/*** mmHG

Vent. Rate : 089 BPM     Atrial Rate : 089 BPM

   P-R Int : 178 ms          QRS Dur : 094 ms

    QT Int : 392 ms       P-R-T Axes : 034 -33 073 degrees

   QTc Int : 476 ms

 

NORMAL SINUS RHYTHM

POSSIBLE LEFT ATRIAL ENLARGEMENT

LEFT AXIS DEVIATION

LEFT VENTRICULAR HYPERTROPHY

INFERIOR INFARCT , AGE UNDETERMINED

CANNOT RULE OUT ANTERIOR INFARCT , AGE UNDETERMINED

ABNORMAL ECG

WHEN COMPARED WITH ECG OF 01-OCT-2018 12:30,

WA INTERVAL HAS DECREASED

INCOMPLETE RIGHT BUNDLE BRANCH BLOCK IS NO LONGER PRESENT

Confirmed by JILLIAN SALVADOR, MANUEL ([id number]) on 1/30/2019 7:09:23 PM

 

Referred By:             Confirmed By:MANUEL WALSH MD

## 2019-01-30 NOTE — PN
Teaching Attending Note


Name of Resident: Ann Marie Barba





ATTENDING PHYSICIAN STATEMENT





I saw and evaluated the patient.


I reviewed the resident's note and discussed the case with the resident.


I agree with the resident's findings and plan as documented.








SUBJECTIVE:


Patient is a 64 yo M wth a PMH of HTN, CKD, was sent by his PCP because of 

elevated BP of 204/81 in the office. Patient denies any headache, no shortness 

of breath, no nausea or vomiting. 








OBJECTIVE:


 Vital Signs











Temperature  98.7 F   01/30/19 11:05


 


Pulse Rate  78   01/30/19 12:30


 


Respiratory Rate  20   01/30/19 12:30


 


Blood Pressure  199/104 H  01/30/19 12:30


 


O2 Sat by Pulse Oximetry (%)  99   01/30/19 12:30








 Initial Vital Signs











Temp Pulse Resp BP Pulse Ox


 


 98.7 F   97 H  16   199/111 H  96 


 


 01/30/19 11:05  01/30/19 11:05  01/30/19 11:05  01/30/19 11:05  01/30/19 11:05











GENERAL: The patient is in no acute distress.


HEAD: Normal with no signs of trauma.


EYES: PERRLA, EOMI, sclera anicteric, conjunctiva clear.


ENT: Ears normal, oropharynx clear without exudates. Moist mucous membranes.


NECK: Normal range of motion, supple without lymphadenopathy, JVD, or masses.


LUNGS: Breath sounds equal, clear to auscultation bilaterally. No wheezes, and 

no crackles.


HEART: KRISTAL 2/6. RRR, normal S1 and S2 without rub or gallop.


ABDOMEN: Soft, nontender, normoactive bowel sounds. No guarding, no rebound. 


EXTREMITIES: Normal range of motion, no edema. No clubbing or cyanosis. No 

erythema, or tenderness.


NEUROLOGICAL: Cranial nerves II through XII grossly intact. Normal speech. No 

focal neurological deficits.


MUSCULOSKELETAL: Back non-tender to palpation, no CVA tenderness


SKIN: Warm, Dry, normal turgor, no rashes or lesions noted.








 CBCD











WBC  6.7 K/mm3 (4.0-10.0)   01/30/19  12:06    


 


RBC  5.06 M/mm3 (4.00-5.60)   01/30/19  12:06    


 


Hgb  12.9 GM/dL (11.7-16.9)   01/30/19  12:06    


 


Hct  38.5 % (35.4-49)   01/30/19  12:06    


 


MCV  76.1 fl (80-96)  L  01/30/19  12:06    


 


MCHC  33.3 g/dl (32.0-35.9)   01/30/19  12:06    


 


RDW  17.4 % (11.9-15.9)  H  01/30/19  12:06    


 


Plt Count  178 K/MM3 (134-434)   01/30/19  12:06    


 


MPV  8.6 fl (7.5-11.1)   01/30/19  12:06    








 CMP











Sodium  144 mmol/L (136-145)   01/30/19  12:06    


 


Potassium  3.5 mmol/L (3.5-5.1)   01/30/19  12:06    


 


Chloride  106 mmol/L ()   01/30/19  12:06    


 


Carbon Dioxide  30 mmol/L (21-32)   01/30/19  12:06    


 


Anion Gap  8 MMOL/L (8-16)   01/30/19  12:06    


 


BUN  19 mg/dL (7-18)  H  01/30/19  12:06    


 


Creatinine  2.1 mg/dL (0.55-1.3)  H  01/30/19  12:06    


 


Creat Clearance w eGFR  31.96  (>60)   01/30/19  12:06    


 


Random Glucose  95 mg/dL ()   01/30/19  12:06    


 


Calcium  9.2 mg/dL (8.5-10.1)   01/30/19  12:06    


 


Total Bilirubin  0.5 mg/dL (0.2-1)   01/30/19  12:06    


 


AST  17 U/L (15-37)   01/30/19  12:06    


 


ALT  18 U/L (13-61)   01/30/19  12:06    


 


Alkaline Phosphatase  86 U/L ()   01/30/19  12:06    


 


Total Protein  7.6 g/dl (6.4-8.2)   01/30/19  12:06    


 


Albumin  3.4 g/dl (3.4-5.0)   01/30/19  12:06    








 CARDIAC ENZYMES











Creatine Kinase  224 U/L ()   01/30/19  12:06    


 


Troponin I  0.11 ng/ml (0.00-0.05)  H  01/30/19  12:06    








 Home Medications











 Medication  Instructions  Recorded


 


Carvedilol [Coreg -] 12.5 mg PO BID #60 tablet 10/04/18


 


Nifedipine ER [Procardia XL -] 60 mg PO DAILY #30 tab.er.24 10/04/18











Head CT: small chronic left thalamic infarct, small left frontal subcortical 

white matter infarct.


EKG: NSR rate of 89 bpm, LAD, no ST elevation or depression, t wave inversion 

aVL and I, t waves upright





Assessment and plan:


Patient is a 64 yo Male with a PMHx of HTN, possible CKD, was sent by his PCP 

since was found to have elevated BP of 204/81 in the office.





# Hypertensive emergency: given Labetolol IV in ED. ON Coreg, hydralazine and 

procardia continue , admit to tele and continue to monitor the patient. 


# Elevated Troponin: 0.11 1st set, will repeat the trop. Echo 10/18 

unremarkable. EF 70%, will repeat the troponins x 2 


   case discussed with cardio Dr. Sanchez over the phone. Agrees with plan, 

will repeat trop and control BP. 


#ZACK over CKD: avoid nephrotoxins, IVF 1/2 NS, 





DVT Px: heparin sq.

## 2019-01-31 LAB
ALBUMIN SERPL-MCNC: 3.3 G/DL (ref 3.4–5)
ALP SERPL-CCNC: 81 U/L (ref 45–117)
ALT SERPL-CCNC: 17 U/L (ref 13–61)
ANION GAP SERPL CALC-SCNC: 9 MMOL/L (ref 8–16)
AST SERPL-CCNC: 21 U/L (ref 15–37)
BASOPHILS # BLD: 0.7 % (ref 0–2)
BILIRUB SERPL-MCNC: 1.1 MG/DL (ref 0.2–1)
BUN SERPL-MCNC: 16 MG/DL (ref 7–18)
CALCIUM SERPL-MCNC: 8.6 MG/DL (ref 8.5–10.1)
CHLORIDE SERPL-SCNC: 106 MMOL/L (ref 98–107)
CO2 SERPL-SCNC: 29 MMOL/L (ref 21–32)
CREAT SERPL-MCNC: 1.8 MG/DL (ref 0.55–1.3)
DEPRECATED RDW RBC AUTO: 17.2 % (ref 11.9–15.9)
EOSINOPHIL # BLD: 5.9 % (ref 0–4.5)
GLUCOSE SERPL-MCNC: 88 MG/DL (ref 74–106)
HCT VFR BLD CALC: 37 % (ref 35.4–49)
HGB BLD-MCNC: 12.3 GM/DL (ref 11.7–16.9)
LYMPHOCYTES # BLD: 28.9 % (ref 8–40)
MAGNESIUM SERPL-MCNC: 2.1 MG/DL (ref 1.8–2.4)
MCH RBC QN AUTO: 24.9 PG (ref 25.7–33.7)
MCHC RBC AUTO-ENTMCNC: 33.1 G/DL (ref 32–35.9)
MCV RBC: 75.2 FL (ref 80–96)
MONOCYTES # BLD AUTO: 9 % (ref 3.8–10.2)
NEUTROPHILS # BLD: 55.5 % (ref 42.8–82.8)
PHOSPHATE SERPL-MCNC: 2.3 MG/DL (ref 2.5–4.9)
PLATELET # BLD AUTO: 178 K/MM3 (ref 134–434)
PMV BLD: 8.6 FL (ref 7.5–11.1)
POTASSIUM SERPLBLD-SCNC: 3 MMOL/L (ref 3.5–5.1)
PROT SERPL-MCNC: 7 G/DL (ref 6.4–8.2)
RBC # BLD AUTO: 4.93 M/MM3 (ref 4–5.6)
SODIUM SERPL-SCNC: 144 MMOL/L (ref 136–145)
WBC # BLD AUTO: 6.1 K/MM3 (ref 4–10)

## 2019-01-31 RX ADMIN — HEPARIN SODIUM SCH UNIT: 5000 INJECTION, SOLUTION INTRAVENOUS; SUBCUTANEOUS at 15:23

## 2019-01-31 RX ADMIN — ASPIRIN 81 MG SCH MG: 81 TABLET ORAL at 11:39

## 2019-01-31 RX ADMIN — CARVEDILOL SCH MG: 25 TABLET, FILM COATED ORAL at 21:08

## 2019-01-31 RX ADMIN — CARVEDILOL SCH MG: 12.5 TABLET, FILM COATED ORAL at 09:29

## 2019-01-31 RX ADMIN — HEPARIN SODIUM SCH UNIT: 5000 INJECTION, SOLUTION INTRAVENOUS; SUBCUTANEOUS at 21:09

## 2019-01-31 RX ADMIN — NIFEDIPINE SCH MG: 90 TABLET, FILM COATED, EXTENDED RELEASE ORAL at 11:00

## 2019-01-31 NOTE — PN
Physical Exam: 


SUBJECTIVE: Patient seen and examined at bedside this morning.


Patient is a 63 year old male with past medical history of HTN, CKD, presented 

from his PCP's office due to elevated BP of 204/81. At the ED, patient was 

noted to have Trop of 0.11 and plateaued at 0.14. Patient denies headache, 

dizziness, chest pain, SOB, palpitations, abdominal pain, diarrhea or urinary 

symptoms. 








OBJECTIVE:





 Vital Signs











Temperature  98.6 F   01/31/19 14:00


 


Pulse Rate  66   01/31/19 14:00


 


Respiratory Rate  20   01/31/19 14:00


 


Blood Pressure  148/88   01/31/19 14:00


 


O2 Sat by Pulse Oximetry (%)  99   01/31/19 07:16














GENERAL: The patient is awake, alert, and fully oriented, in no acute distress.


HEAD: Normal with no signs of trauma.


EYES: PERRLA, EOMI, sclera anicteric, conjunctiva clear. 


ENT: Ears normal, nares patent, oropharynx clear without exudates, moist mucous 

membranes.


NECK: Trachea midline, full range of motion, supple. 


LUNGS: Breath sounds equal, clear to auscultation bilaterally.


HEART: Regular rate and rhythm, S1, S2 without murmur, rub or gallop.


ABDOMEN: Soft, nontender, nondistended, normoactive bowel sounds.


EXTREMITIES: 2+ pulses, warm, well-perfused, no edema. 


NEUROLOGICAL: Cranial nerves II through XII grossly intact. Normal speech, gait 

not observed.


PSYCH: Normal mood, normal affect.


SKIN: Warm, dry, normal turgor, no rashes or lesions noted














 Laboratory Results - last 24 hr











  01/30/19 01/30/19 01/31/19





  15:30 22:48 05:30


 


WBC    6.1


 


RBC    4.93


 


Hgb    12.3


 


Hct    37.0


 


MCV    75.2 L


 


MCH    24.9 L


 


MCHC    33.1


 


RDW    17.2 H


 


Plt Count    178


 


MPV    8.6


 


Absolute Neuts (auto)    3.4


 


Neutrophils %    55.5  D


 


Lymphocytes %    28.9  D


 


Monocytes %    9.0


 


Eosinophils %    5.9 H


 


Basophils %    0.7


 


Nucleated RBC %    0


 


Sodium   


 


Potassium   


 


Chloride   


 


Carbon Dioxide   


 


Anion Gap   


 


BUN   


 


Creatinine   


 


Creat Clearance w eGFR   


 


Random Glucose   


 


Calcium   


 


Phosphorus   


 


Magnesium   


 


Total Bilirubin   


 


AST   


 


ALT   


 


Alkaline Phosphatase   


 


Creatine Kinase  208  282 


 


Creatine Kinase Index  0.9  0.7 


 


CK-MB (CK-2)  1.9  2.0 


 


Troponin I  0.12 H  0.14 H 


 


Total Protein   


 


Albumin   














  01/31/19 01/31/19





  05:30 05:30


 


WBC  


 


RBC  


 


Hgb  


 


Hct  


 


MCV  


 


MCH  


 


MCHC  


 


RDW  


 


Plt Count  


 


MPV  


 


Absolute Neuts (auto)  


 


Neutrophils %  


 


Lymphocytes %  


 


Monocytes %  


 


Eosinophils %  


 


Basophils %  


 


Nucleated RBC %  


 


Sodium  144 


 


Potassium  3.0 L 


 


Chloride  106 


 


Carbon Dioxide  29 


 


Anion Gap  9 


 


BUN  16 


 


Creatinine  1.8 H 


 


Creat Clearance w eGFR  38.18 


 


Random Glucose  88 


 


Calcium  8.6 


 


Phosphorus  2.3 L 


 


Magnesium  2.1 


 


Total Bilirubin  1.1 H 


 


AST  21 


 


ALT  17 


 


Alkaline Phosphatase  81 


 


Creatine Kinase  


 


Creatine Kinase Index  


 


CK-MB (CK-2)  


 


Troponin I   0.14 H


 


Total Protein  7.0 


 


Albumin  3.3 L 








Active Medications











Generic Name Dose Route Start Last Admin





  Trade Name Freq  PRN Reason Stop Dose Admin


 


Aspirin  81 mg  01/31/19 11:45  01/31/19 11:39





  Asa -  PO   81 mg





  DAILY GUALBERTO   Administration





     





     





     





     


 


Carvedilol  25 mg  01/31/19 11:31  





  Coreg -  PO   





  BID GUALBERTO   





     





     





     





     


 


Heparin Sodium (Porcine)  5,000 unit  01/31/19 14:00  01/31/19 15:23





  Heparin -  SQ   5,000 unit





  TID GUALBERTO   Administration





     





     





     





     


 


Nifedipine  90 mg  01/31/19 10:00  01/31/19 11:00





  Procardia Xl -  PO   90 mg





  DAILY GUALBERTO   Administration





     





     





     





     











ASSESSMENT/PLAN:


Patient is a 63 year old male with past medical history of HTN, CKD, presented 

from his PCP's office due to elevated BP of 204/81.





#Hypertensive emergency


-IV Labetolol and Hydralazine given overnight


-Cardiology (Dr. Orr) consulted. Recommendations appreciated.


-Nifedipine increased from 60mg to 90mg daily


-Carvedilol increased from 12.5mg to 25mg BID


-ASA 81 mg daily started.


-Will continue to monitor BP





#Elevated troponin


-Troponin plateaued at 0.14


-likely 2/2 demand ischemia





#CKD


-BUN/Cr  improved this morning, at baseline


-Avoid nephrotoxic agents such as NSAIDs, aminoglycosides, contrast dyes





#FEN


-Not on any standing fluids


-HypoK, repleted


-Routine bmp monitoring


-Sodium restricted diet





#Prophylaxis


-Heparin 5000units sq tid





#Disposition


-full code


-admit to tele





Visit type





- Emergency Visit


Emergency Visit: Yes


ED Registration Date: 01/31/19


Care time: The patient presented to the Emergency Department on the above date 

and was hospitalized for further evaluation of their emergent condition.





- New Patient


This patient is new to me today: Yes


Date on this admission: 01/31/19





- Critical Care


Critical Care patient: No

## 2019-01-31 NOTE — CON.CARD
Consult


Consult Specialty:: Cardiology


Referred by:: Hospitalist Medicine


Reason for Consultation:: Hypertensive urgency, subendocardial ischemia





- History of Present Illness


Chief Complaint: Hypertensive urgency


History of Present Illness: 


Pt is a 62yo m with PMH of HTN, CKD, referred for elevated /81. He denies 

chest pain, dyspnea, palpitations, near or true syncope, orthopnea, PND, 

headache or LE edema. Reports compliance with diet and meds, denies NSAID use.

















- History Source


History Provided By: Patient


Limitations to Obtaining History: No Limitations





- Past Medical History


Cardio/Vascular: Yes: HTN





- Alcohol/Substance Use


Hx Alcohol Use: No





- Smoking History


Smoking history: Never smoked


Have you smoked in the past 12 months: No





Home Medications





- Allergies


Allergies/Adverse Reactions: 


 Allergies











Allergy/AdvReac Type Severity Reaction Status Date / Time


 


No Known Allergies Allergy   Verified 01/30/19 11:16














- Home Medications


Home Medications: 


Ambulatory Orders





Carvedilol [Coreg -] 12.5 mg PO BID #60 tablet 10/04/18 


Nifedipine ER [Procardia XL -] 60 mg PO DAILY #30 tab.er.24 10/04/18 











Family Disease History





- Family Disease History


Family Disease History: Other: Brother (htn), Sister (htn), Son (htn)


Vital Signs: 


 Vital Signs











Temperature  98.3 F   01/31/19 06:00


 


Pulse Rate  71   01/31/19 09:44


 


Respiratory Rate  18   01/31/19 09:44


 


Blood Pressure  171/88 H  01/31/19 09:44


 


O2 Sat by Pulse Oximetry (%)  99   01/31/19 07:16











Constitutional: Yes: No Distress, Calm


Neck: Yes: Supple


Respiratory: Yes: Regular, CTA Bilaterally


Gastrointestinal: Yes: Normal Bowel Sounds, Soft


Cardiovascular: Yes: Regular Rate and Rhythm


JVD: No


Carotid Bruit: No


Heart Sounds: Yes: S1, S2


Edema: No





- Other Data


Labs, Other Data: 


 CBC, BMP





 01/31/19 05:30 





 01/31/19 05:30 





 INR, PTT











INR  1.03  (0.83-1.09)   01/30/19  12:06    








 Troponin, BNP











  01/30/19 01/30/19 01/30/19





  12:06 15:30 22:48


 


Troponin I  0.11 H  0.12 H  0.14 H














  01/31/19





  05:30


 


Troponin I  0.14 H








 Troponin, BNP











  01/30/19 01/30/19 01/30/19





  12:06 15:30 22:48


 


Troponin I  0.11 H  0.12 H  0.14 H














  01/31/19





  05:30


 


Troponin I  0.14 H











NSR @ 89 LAE, LVH, LAD





Ejection Fraction %: LVEF > or = 40 %





Imaging





- Results


Chest X-ray: Report Reviewed (NAD)


Cat Scan: Report Reviewed (HCT: Small chronic left thalamic and small chronic 

left frontal infarcts with SVID)





Problem List





- Problems


(1) Cerebrovascular disease


Code(s): I67.9 - CEREBROVASCULAR DISEASE, UNSPECIFIED   





(2) Acute-on-chronic kidney injury


Code(s): N17.9 - ACUTE KIDNEY FAILURE, UNSPECIFIED; N18.9 - CHRONIC KIDNEY 

DISEASE, UNSPECIFIED   


Qualifiers: 


   Acute renal failure type: unspecified 





(3) Demand ischemia


Code(s): I24.8 - OTHER FORMS OF ACUTE ISCHEMIC HEART DISEASE   





(4) Hypertensive urgency


Code(s): I16.0 - HYPERTENSIVE URGENCY   





(5) Hypokalemia


Code(s): E87.6 - HYPOKALEMIA   





Assessment/Plan


Echo: 10/02/2018 Normal LV size with mod cLVH, hyperdynamic LVEF 70%, mild LAE, 

mild AR, TR





1. Hypertensive cardiovascular disease, labile blood pressure uncontrolled


2. CAD angina pectoris with evidence of demand ischemia


3. Diastolic LV dysfunction with class 0 NYHA classification LV failure


4. CKD


5. Hypokalemia 


6. Cerebrovascular disease





PLAN:





1. Trops have plateaued, increased carvedilol 25 bid 


2. Increased Procardia XL 90 qd, add ASA 81 qd


3. Ideally should be on ACEI or ARBS unless contraindicated, pending renal 

function stabilization


4. Correction of Hypokalemia


5. Thank you for consultative opportunity

## 2019-01-31 NOTE — PN
Teaching Attending Note


Name of Resident: Ngoc Wheat





ATTENDING PHYSICIAN STATEMENT





I saw and evaluated the patient.


I reviewed the resident's note and discussed the case with the resident.


I agree with the resident's findings and plan as documented.








SUBJECTIVE:


Patient feels better with no acute distress. 





OBJECTIVE:





 Vital Signs











Temperature  98.6 F   01/31/19 14:00


 


Pulse Rate  66   01/31/19 14:00


 


Respiratory Rate  20   01/31/19 14:00


 


Blood Pressure  148/88   01/31/19 14:00


 


O2 Sat by Pulse Oximetry (%)  99   01/31/19 07:16








GENERAL: The patient is in no acute distress.


HEAD: Normal with no signs of trauma.


EYES: PERRLA, EOMI, sclera anicteric, conjunctiva clear.


ENT: Ears normal, oropharynx clear without exudates. Moist mucous membranes.


NECK: Normal range of motion, supple without lymphadenopathy, JVD, or masses.


LUNGS: Breath sounds equal, clear to auscultation bilaterally. No wheezes, and 

no crackles.


HEART: KRISTAL 2/6. RRR, normal S1 and S2 without rub or gallop.


ABDOMEN: Soft, nontender, normoactive bowel sounds. No guarding, no rebound. 


EXTREMITIES: Normal range of motion, no edema. No clubbing or cyanosis. No 

erythema, or tenderness.


NEUROLOGICAL: Cranial nerves II through XII grossly intact. Normal speech. No 

focal neurological deficits.


MUSCULOSKELETAL: Back non-tender to palpation, no CVA tenderness


SKIN: Warm, Dry, normal turgor, no rashes or lesions noted.


CBCD











WBC  6.1 K/mm3 (4.0-10.0)   01/31/19  05:30    


 


RBC  4.93 M/mm3 (4.00-5.60)   01/31/19  05:30    


 


Hgb  12.3 GM/dL (11.7-16.9)   01/31/19  05:30    


 


Hct  37.0 % (35.4-49)   01/31/19  05:30    


 


MCV  75.2 fl (80-96)  L  01/31/19  05:30    


 


MCHC  33.1 g/dl (32.0-35.9)   01/31/19  05:30    


 


RDW  17.2 % (11.9-15.9)  H  01/31/19  05:30    


 


Plt Count  178 K/MM3 (134-434)   01/31/19  05:30    


 


MPV  8.6 fl (7.5-11.1)   01/31/19  05:30    








 CMP











Sodium  144 mmol/L (136-145)   01/31/19  05:30    


 


Potassium  3.0 mmol/L (3.5-5.1)  L  01/31/19  05:30    


 


Chloride  106 mmol/L ()   01/31/19  05:30    


 


Carbon Dioxide  29 mmol/L (21-32)   01/31/19  05:30    


 


Anion Gap  9 MMOL/L (8-16)   01/31/19  05:30    


 


BUN  16 mg/dL (7-18)   01/31/19  05:30    


 


Creatinine  1.8 mg/dL (0.55-1.3)  H  01/31/19  05:30    


 


Creat Clearance w eGFR  38.18  (>60)   01/31/19  05:30    


 


Random Glucose  88 mg/dL ()   01/31/19  05:30    


 


Calcium  8.6 mg/dL (8.5-10.1)   01/31/19  05:30    


 


Total Bilirubin  1.1 mg/dL (0.2-1)  H  01/31/19  05:30    


 


AST  21 U/L (15-37)   01/31/19  05:30    


 


ALT  17 U/L (13-61)   01/31/19  05:30    


 


Alkaline Phosphatase  81 U/L ()   01/31/19  05:30    


 


Total Protein  7.0 g/dl (6.4-8.2)   01/31/19  05:30    


 


Albumin  3.3 g/dl (3.4-5.0)  L  01/31/19  05:30    








 CARDIAC ENZYMES











Creatine Kinase  282 U/L ()   01/30/19  22:48    


 


Troponin I  0.14 ng/ml (0.00-0.05)  H  01/31/19  05:30    








 Current Medications











Generic Name Dose Route Start Last Admin





  Trade Name Freq  PRN Reason Stop Dose Admin


 


Aspirin  81 mg  01/31/19 11:45  01/31/19 11:39





  Asa -  PO   81 mg





  DAILY GUALBERTO   Administration





     





     





     





     


 


Carvedilol  25 mg  01/31/19 11:31  





  Coreg -  PO   





  BID [facility]   





     





     





     





     


 


Heparin Sodium (Porcine)  5,000 unit  01/31/19 14:00  01/31/19 15:23





  Heparin -  SQ   5,000 unit





  TID GUALBERTO   Administration





     





     





     





     


 


Nifedipine  90 mg  01/31/19 10:00  01/31/19 11:00





  Procardia Xl -  PO   90 mg





  DAILY GUALBERTO   Administration





     





     





     





     








 Home Medications











 Medication  Instructions  Recorded


 


Aspirin [ASA -] 81 mg PO DAILY #30 tab.chew 01/31/19


 


Carvedilol [Coreg -] 25 mg PO BID #60 tablet 01/31/19


 


Nifedipine ER [Procardia XL -] 90 mg PO DAILY #30 tab.er.24 01/31/19














Head CT: small chronic left thalamic infarct, small left frontal subcortical 

white matter infarct.


EKG: NSR rate of 89 bpm, LAD, no ST elevation or depression, t wave inversion 

aVL and I, t waves upright





Assessment and plan:


Patient is a 64 yo Male with a PMHx of HTN, possible CKD, was sent by his PCP 

since was found to have elevated BP of 204/81 in the office.





# Hypertensive emergency: ON coreg, increased the dose of procarida to 90xl ,

continue to monitor the blood pressure. 


# Elevated Troponin: 0.11 1st set, will repeat the trop. Echo 10/18 

unremarkable. EF 70%


   case discussed with cardio Dr. Sanchez over the phone. Agrees with plan, 

will repeat trop and control BP. 


#ZACK over CKD: avoid nephrotoxins, IVF 1/2 NS





# Hypokalemia will replete. 





DVT Px: heparin sq. 


 Laboratory Tests











  01/30/19 01/30/19 01/30/19





  12:06 15:30 22:48


 


Troponin I  0.11 H  0.12 H  0.14 H














  01/31/19





  05:30


 


Troponin I  0.14 H

## 2019-02-01 LAB
ANION GAP SERPL CALC-SCNC: 9 MMOL/L (ref 8–16)
BUN SERPL-MCNC: 23 MG/DL (ref 7–18)
CALCIUM SERPL-MCNC: 8.8 MG/DL (ref 8.5–10.1)
CHLORIDE SERPL-SCNC: 107 MMOL/L (ref 98–107)
CO2 SERPL-SCNC: 26 MMOL/L (ref 21–32)
CREAT SERPL-MCNC: 1.9 MG/DL (ref 0.55–1.3)
GLUCOSE SERPL-MCNC: 89 MG/DL (ref 74–106)
MAGNESIUM SERPL-MCNC: 2.1 MG/DL (ref 1.8–2.4)
PHOSPHATE SERPL-MCNC: 2.8 MG/DL (ref 2.5–4.9)
POTASSIUM SERPLBLD-SCNC: 3.2 MMOL/L (ref 3.5–5.1)
SODIUM SERPL-SCNC: 142 MMOL/L (ref 136–145)

## 2019-02-01 RX ADMIN — CARVEDILOL SCH MG: 25 TABLET, FILM COATED ORAL at 21:27

## 2019-02-01 RX ADMIN — HEPARIN SODIUM SCH UNIT: 5000 INJECTION, SOLUTION INTRAVENOUS; SUBCUTANEOUS at 06:04

## 2019-02-01 RX ADMIN — ASPIRIN 81 MG SCH: 81 TABLET ORAL at 10:13

## 2019-02-01 RX ADMIN — ASPIRIN 81 MG SCH MG: 81 TABLET ORAL at 08:53

## 2019-02-01 RX ADMIN — NIFEDIPINE SCH MG: 90 TABLET, FILM COATED, EXTENDED RELEASE ORAL at 08:53

## 2019-02-01 RX ADMIN — CARVEDILOL SCH: 25 TABLET, FILM COATED ORAL at 10:13

## 2019-02-01 RX ADMIN — NIFEDIPINE SCH: 90 TABLET, FILM COATED, EXTENDED RELEASE ORAL at 10:13

## 2019-02-01 RX ADMIN — HEPARIN SODIUM SCH UNIT: 5000 INJECTION, SOLUTION INTRAVENOUS; SUBCUTANEOUS at 14:29

## 2019-02-01 RX ADMIN — CARVEDILOL SCH MG: 25 TABLET, FILM COATED ORAL at 08:53

## 2019-02-01 RX ADMIN — HEPARIN SODIUM SCH UNIT: 5000 INJECTION, SOLUTION INTRAVENOUS; SUBCUTANEOUS at 21:27

## 2019-02-01 NOTE — PN
Teaching Attending Note


Name of Resident: Ngoc Wheat





ATTENDING PHYSICIAN STATEMENT





I saw and evaluated the patient.


I reviewed the resident's note and discussed the case with the resident.


I agree with the resident's findings and plan as documented.








SUBJECTIVE:





Patient has no new complain. 


OBJECTIVE:


 Vital Signs











Temperature  98 F   02/01/19 16:49


 


Pulse Rate  62   02/01/19 16:49


 


Respiratory Rate  18   02/01/19 16:49


 


Blood Pressure  172/96 H  02/01/19 16:49


 


O2 Sat by Pulse Oximetry (%)  98   02/01/19 09:00








GENERAL: The patient is in no acute distress.


HEAD: Normal with no signs of trauma.


EYES: PERRLA, EOMI, sclera anicteric, conjunctiva clear.


ENT: Ears normal, oropharynx clear without exudates. Moist mucous membranes.


NECK: Normal range of motion, supple without lymphadenopathy, JVD, or masses.


LUNGS: Breath sounds equal, clear to auscultation bilaterally. No wheezes, and 

no crackles.


HEART: KRISTAL 2/6. RRR, normal S1 and S2 without rub or gallop.


ABDOMEN: Soft, nontender, normoactive bowel sounds. No guarding, no rebound. 


EXTREMITIES: Normal range of motion, no edema. No clubbing or cyanosis. No 

erythema, or tenderness.


NEUROLOGICAL: Cranial nerves II through XII grossly intact. Normal speech. No 

focal neurological deficits.


MUSCULOSKELETAL: Back non-tender to palpation, no CVA tenderness


SKIN: Warm, Dry, normal turgor, no rashes or lesions noted.


 CBCD











WBC  6.1 K/mm3 (4.0-10.0)   01/31/19  05:30    


 


RBC  4.93 M/mm3 (4.00-5.60)   01/31/19  05:30    


 


Hgb  12.3 GM/dL (11.7-16.9)   01/31/19  05:30    


 


Hct  37.0 % (35.4-49)   01/31/19  05:30    


 


MCV  75.2 fl (80-96)  L  01/31/19  05:30    


 


MCHC  33.1 g/dl (32.0-35.9)   01/31/19  05:30    


 


RDW  17.2 % (11.9-15.9)  H  01/31/19  05:30    


 


Plt Count  178 K/MM3 (134-434)   01/31/19  05:30    


 


MPV  8.6 fl (7.5-11.1)   01/31/19  05:30    








 CMP











Sodium  142 mmol/L (136-145)   02/01/19  05:30    


 


Potassium  3.2 mmol/L (3.5-5.1)  L  02/01/19  05:30    


 


Chloride  107 mmol/L ()   02/01/19  05:30    


 


Carbon Dioxide  26 mmol/L (21-32)   02/01/19  05:30    


 


Anion Gap  9 MMOL/L (8-16)   02/01/19  05:30    


 


BUN  23 mg/dL (7-18)  H  02/01/19  05:30    


 


Creatinine  1.9 mg/dL (0.55-1.3)  H  02/01/19  05:30    


 


Creat Clearance w eGFR  35.87  (>60)   02/01/19  05:30    


 


Random Glucose  89 mg/dL ()   02/01/19  05:30    


 


Calcium  8.8 mg/dL (8.5-10.1)   02/01/19  05:30    


 


Total Bilirubin  1.1 mg/dL (0.2-1)  H  01/31/19  05:30    


 


AST  21 U/L (15-37)   01/31/19  05:30    


 


ALT  17 U/L (13-61)   01/31/19  05:30    


 


Alkaline Phosphatase  81 U/L ()   01/31/19  05:30    


 


Total Protein  7.0 g/dl (6.4-8.2)   01/31/19  05:30    


 


Albumin  3.3 g/dl (3.4-5.0)  L  01/31/19  05:30    








 CARDIAC ENZYMES











Creatine Kinase  282 U/L ()   01/30/19  22:48    


 


Troponin I  0.14 ng/ml (0.00-0.05)  H  01/31/19  05:30    








 Current Medications











Generic Name Dose Route Start Last Admin





  Trade Name Freq  PRN Reason Stop Dose Admin


 


Aspirin  81 mg  01/31/19 11:45  02/01/19 10:13





  Asa -  PO   Not Given





  DAILY [facility]   





     





     





     





     


 


Carvedilol  25 mg  01/31/19 11:31  02/01/19 10:13





  Coreg -  PO   Not Given





  BID [facility]   





     





     





     





     


 


Heparin Sodium (Porcine)  5,000 unit  01/31/19 14:00  02/01/19 14:29





  Heparin -  SQ   5,000 unit





  TID [facility]   Administration





     





     





     





     


 


Losartan Potassium  50 mg  02/02/19 10:00  





  Cozaar -  PO   





  DAILY [facility]   





     





     





     





     


 


Nifedipine  90 mg  01/31/19 10:00  02/01/19 10:13





  Procardia Xl -  PO   Not Given





  DAILY [facility]   





     





     





     





     


 


Potassium Chloride  40 meq  02/02/19 10:00  





  K-Dur -  PO   





  DAILY [facility]   





     





     





     





     








 


 


 Home Medications











 Medication  Instructions  Recorded


 


Aspirin [ASA -] 81 mg PO DAILY #30 tab.chew 01/31/19


 


Carvedilol [Coreg -] 25 mg PO BID #60 tablet 01/31/19


 


Nifedipine ER [Procardia XL -] 90 mg PO DAILY #30 tab.er.24 01/31/19





 Laboratory Tests











  01/30/19 01/30/19 01/30/19





  12:06 15:30 22:48


 


Troponin I  0.11 H  0.12 H  0.14 H














  01/31/19





  05:30


 


Troponin I  0.14 H

















Head CT: small chronic left thalamic infarct, small left frontal subcortical 

white matter infarct.


EKG: NSR rate of 89 bpm, LAD, no ST elevation or depression, t wave inversion 

aVL and I, t waves upright





Assessment and plan:


Patient is a 64 yo Male with a PMHx of HTN, possible CKD, was sent by his PCP 

since was found to have elevated BP of 204/81 in the office.





# Hypertensive emergency: ON coreg, increased the dose of procardia to 90xl, 

added losartan 50mg po daily, will continue to monitor the blood pressure. 


# Elevated Troponin: 0.11--> 0.14. Echo 10/18 unremarkable. EF 70%, cardiology 

on the case. 


#ZACK over CKD: avoid nephrotoxins, stable now, at the base. 


# Hypokalemia will replete. 





DVT Px: heparin sq.

## 2019-02-01 NOTE — PN
Progress Note, Physician


History of Present Illness: 


BP remains elevated, losartan 50 qd started. Denies chest pain, dyspnea, 

headaches.








- Current Medication List


Current Medications: 


Active Medications





Aspirin (Asa -)  81 mg PO DAILY [facility]


   Last Admin: 02/01/19 10:13 Dose:  Not Given


Carvedilol (Coreg -)  25 mg PO BID [facility]


   Last Admin: 02/01/19 10:13 Dose:  Not Given


Heparin Sodium (Porcine) (Heparin -)  5,000 unit SQ TID [facility]


   Last Admin: 02/01/19 06:04 Dose:  5,000 unit


Losartan Potassium (Cozaar -)  50 mg PO ONCE ONE


   Stop: 02/01/19 11:40


Losartan Potassium (Cozaar -)  50 mg PO DAILY [facility]


Nifedipine (Procardia Xl -)  90 mg PO DAILY [facility]


   Last Admin: 02/01/19 10:13 Dose:  Not Given











- Objective


Vital Signs: 


 Vital Signs











Temperature  98 F   02/01/19 08:55


 


Pulse Rate  60   02/01/19 10:42


 


Respiratory Rate  18   02/01/19 08:55


 


Blood Pressure  186/108 H  02/01/19 10:42


 


O2 Sat by Pulse Oximetry (%)  96   01/31/19 19:47











Constitutional: Yes: No Distress, Calm


Neck: Yes: Supple


Cardiovascular: Yes: Regular Rate and Rhythm


Respiratory: Yes: Regular, CTA Bilaterally


Gastrointestinal: Yes: Normal Bowel Sounds, Soft


Edema: No


Labs: 


 CBC, BMP





 01/31/19 05:30 





 02/01/19 05:30 





 INR, PTT











INR  1.03  (0.83-1.09)   01/30/19  12:06    














- ....Imaging


EKG: Report Reviewed (Tele: SR)





Problem List





- Problems


(1) Cerebrovascular disease


Code(s): I67.9 - CEREBROVASCULAR DISEASE, UNSPECIFIED   





(2) Acute-on-chronic kidney injury


Code(s): N17.9 - ACUTE KIDNEY FAILURE, UNSPECIFIED; N18.9 - CHRONIC KIDNEY 

DISEASE, UNSPECIFIED   


Qualifiers: 


   Acute renal failure type: unspecified 





(3) Demand ischemia


Code(s): I24.8 - OTHER FORMS OF ACUTE ISCHEMIC HEART DISEASE   





(4) Hypertensive urgency


Code(s): I16.0 - HYPERTENSIVE URGENCY   





(5) Hypokalemia


Code(s): E87.6 - HYPOKALEMIA   





Assessment/Plan


Echo: 10/02/2018 Normal LV size with mod cLVH, hyperdynamic LVEF 70%, mild LAE, 

mild AR, TR





1. Hypertensive cardiovascular disease, labile blood pressure uncontrolled


2. CAD angina pectoris with evidence of demand ischemia


3. Diastolic LV dysfunction with class 0 NYHA classification LV failure


4. CKD


5. Hypokalemia 


6. Cerebrovascular disease





PLAN:





1. Trops have plateaued, continue carvedilol 25 bid 


2. Continue Procardia XL 90 qd, ASA 81 qd and added losartan 50 qd given renal 

function stable


3. Correction of Hypokalemia

## 2019-02-01 NOTE — PN
Physical Exam: 


SUBJECTIVE: Patient seen and examined at bedside this morning. No acute events 

overnight. Patient was still hypertensive this morning. Denies fever, chills, 

headache, dizziness, chest pain, SOB, palpitations, abdominal pain.








OBJECTIVE:





 Vital Signs











 Period  Temp  Pulse  Resp  BP Sys/[place]  Pulse Ox


 


 Last 24 Hr  97.9 F-98.6 F  60-71  18-20  144-186/  96-98











GENERAL: The patient is awake, alert, and fully oriented, in no acute distress.


HEAD: Normal with no signs of trauma.


EYES: PERRLA, EOMI, sclera anicteric, conjunctiva clear. 


ENT: Ears normal, nares patent, oropharynx clear without exudates, moist mucous 

membranes.


NECK: Trachea midline, full range of motion, supple. 


LUNGS: Breath sounds equal, clear to auscultation bilaterally.


HEART: Regular rate and rhythm, S1, S2 without murmur, rub or gallop.


ABDOMEN: Soft, nontender, nondistended, normoactive bowel sounds.


EXTREMITIES: 2+ pulses, warm, well-perfused, no edema. 


NEUROLOGICAL: Cranial nerves II through XII grossly intact. Normal speech, gait 

not observed.


PSYCH: Normal mood, normal affect.


SKIN: Warm, dry, normal turgor, no rashes or lesions noted





 Laboratory Results - last 24 hr











  02/01/19





  05:30


 


Sodium  142


 


Potassium  3.2 L


 


Chloride  107


 


Carbon Dioxide  26


 


Anion Gap  9


 


BUN  23 H


 


Creatinine  1.9 H


 


Creat Clearance w eGFR  35.87


 


Random Glucose  89


 


Calcium  8.8


 


Phosphorus  2.8


 


Magnesium  2.1








Active Medications











Generic Name Dose Route Start Last Admin





  Trade Name Freq  PRN Reason Stop Dose Admin


 


Aspirin  81 mg  01/31/19 11:45  02/01/19 10:13





  Asa -  PO   Not Given





  DAILY GUALBERTO   





     





     





     





     


 


Carvedilol  25 mg  01/31/19 11:31  02/01/19 10:13





  Coreg -  PO   Not Given





  BID [facility]   





     





     





     





     


 


Heparin Sodium (Porcine)  5,000 unit  01/31/19 14:00  02/01/19 06:04





  Heparin -  SQ   5,000 unit





  TID [facility]   Administration





     





     





     





     


 


Losartan Potassium  50 mg  02/02/19 10:00  





  Cozaar -  PO   





  DAILY GUALBERTO   





     





     





     





     


 


Nifedipine  90 mg  01/31/19 10:00  02/01/19 10:13





  Procardia Xl -  PO   Not Given





  DAILY [facility]   





     





     





     





     











ASSESSMENT/PLAN:


Patient is a 63 year old male with past medical history of HTN, CKD, presented 

from his PCP's office due to elevated BP of 204/81.





#Hypertensive emergency


-IV Labetolol and Hydralazine given at the ED


-Cardiology (Dr. Orr) consulted. Recommendations appreciated.


-Nifedipine increased from 60mg to 90mg daily


-Carvedilol increased from 12.5mg to 25mg BID


-ASA 81 mg daily started.


-Losartan 50mg daily added.


-Will continue to monitor BP





#Elevated troponin


-Troponin plateaued at 0.14


-likely 2/2 demand ischemia





#CKD


-BUN/Cr  improved this morning, at baseline


-Avoid nephrotoxic agents such as NSAIDs, aminoglycosides, contrast dyes





#FEN


-Not on any standing fluids


-HypoK, repleted


-Routine bmp monitoring


-Sodium restricted diet





#Prophylaxis


-Heparin 5000units sq tid





#Disposition


-full code


-admit to tele








Visit type





- Emergency Visit


Emergency Visit: Yes


ED Registration Date: 01/31/19


Care time: The patient presented to the Emergency Department on the above date 

and was hospitalized for further evaluation of their emergent condition.





- New Patient


This patient is new to me today: No





- Critical Care


Critical Care patient: No

## 2019-02-02 VITALS — HEART RATE: 65 BPM | DIASTOLIC BLOOD PRESSURE: 111 MMHG | SYSTOLIC BLOOD PRESSURE: 160 MMHG | TEMPERATURE: 98.2 F

## 2019-02-02 LAB
ANION GAP SERPL CALC-SCNC: 8 MMOL/L (ref 8–16)
BUN SERPL-MCNC: 26 MG/DL (ref 7–18)
CALCIUM SERPL-MCNC: 8.4 MG/DL (ref 8.5–10.1)
CHLORIDE SERPL-SCNC: 107 MMOL/L (ref 98–107)
CO2 SERPL-SCNC: 26 MMOL/L (ref 21–32)
CREAT SERPL-MCNC: 1.9 MG/DL (ref 0.55–1.3)
GLUCOSE SERPL-MCNC: 82 MG/DL (ref 74–106)
MAGNESIUM SERPL-MCNC: 2.2 MG/DL (ref 1.8–2.4)
PHOSPHATE SERPL-MCNC: 2.6 MG/DL (ref 2.5–4.9)
POTASSIUM SERPLBLD-SCNC: 3.4 MMOL/L (ref 3.5–5.1)
SODIUM SERPL-SCNC: 141 MMOL/L (ref 136–145)

## 2019-02-02 RX ADMIN — CARVEDILOL SCH MG: 25 TABLET, FILM COATED ORAL at 09:46

## 2019-02-02 RX ADMIN — HEPARIN SODIUM SCH UNIT: 5000 INJECTION, SOLUTION INTRAVENOUS; SUBCUTANEOUS at 14:21

## 2019-02-02 RX ADMIN — NIFEDIPINE SCH MG: 90 TABLET, FILM COATED, EXTENDED RELEASE ORAL at 09:46

## 2019-02-02 RX ADMIN — HEPARIN SODIUM SCH UNIT: 5000 INJECTION, SOLUTION INTRAVENOUS; SUBCUTANEOUS at 06:45

## 2019-02-02 RX ADMIN — ASPIRIN 81 MG SCH MG: 81 TABLET ORAL at 09:46

## 2019-02-02 NOTE — PN
Progress Note, Physician


History of Present Illness: 


BP control improved on current antihypertensive regimen. Denies chest pain, 

dyspnea, headaches.








- Current Medication List


Current Medications: 


Active Medications





Aspirin (Asa -)  81 mg PO DAILY [facility]


   Last Admin: 02/02/19 09:46 Dose:  81 mg


Carvedilol (Coreg -)  25 mg PO BID [facility]


   Last Admin: 02/02/19 09:46 Dose:  25 mg


Heparin Sodium (Porcine) (Heparin -)  5,000 unit SQ TID [facility]


   Last Admin: 02/02/19 06:45 Dose:  5,000 unit


Losartan Potassium (Cozaar -)  50 mg PO DAILY [facility]


   Last Admin: 02/02/19 09:47 Dose:  50 mg


Nifedipine (Procardia Xl -)  90 mg PO DAILY [facility]


   Last Admin: 02/02/19 09:46 Dose:  90 mg


Potassium Chloride (K-Dur -)  40 meq PO DAILY [facility]


   Last Admin: 02/02/19 09:46 Dose:  40 meq











- Objective


Vital Signs: 


 Vital Signs











Temperature  98.1 F   02/02/19 09:00


 


Pulse Rate  63   02/02/19 09:00


 


Respiratory Rate  18   02/02/19 11:58


 


Blood Pressure  157/103 H  02/02/19 11:58


 


O2 Sat by Pulse Oximetry (%)  97   02/02/19 09:00











Constitutional: Yes: No Distress, Calm


Neck: Yes: Supple


Cardiovascular: Yes: Regular Rate and Rhythm


Respiratory: Yes: Regular, CTA Bilaterally


Gastrointestinal: Yes: Normal Bowel Sounds, Soft


Edema: No


Labs: 


 CBC, BMP





 01/31/19 05:30 





 02/02/19 05:25 





 INR, PTT











INR  1.03  (0.83-1.09)   01/30/19  12:06    














- ....Imaging


EKG: Report Reviewed (Tele: NSR)





Problem List





- Problems


(1) Cerebrovascular disease


Code(s): I67.9 - CEREBROVASCULAR DISEASE, UNSPECIFIED   





(2) Acute-on-chronic kidney injury


Code(s): N17.9 - ACUTE KIDNEY FAILURE, UNSPECIFIED; N18.9 - CHRONIC KIDNEY 

DISEASE, UNSPECIFIED   


Qualifiers: 


   Acute renal failure type: unspecified 





(3) Demand ischemia


Code(s): I24.8 - OTHER FORMS OF ACUTE ISCHEMIC HEART DISEASE   





(4) Hypertensive urgency


Code(s): I16.0 - HYPERTENSIVE URGENCY   





(5) Hypokalemia


Code(s): E87.6 - HYPOKALEMIA   





Assessment/Plan


Echo: 10/02/2018 Normal LV size with mod cLVH, hyperdynamic LVEF 70%, mild LAE, 

mild AR, TR





1. Hypertensive cardiovascular disease, labile blood pressure with improved 

control


2. CAD angina pectoris with evidence of demand ischemia


3. Diastolic LV dysfunction with class 0 NYHA classification LV failure


4. CKD


5. Hypokalemia 


6. Cerebrovascular disease





PLAN:





1. Trops have plateaued, continue carvedilol 25 bid 


2. Continue Procardia XL 90 qd, ASA 81 qd and losartan 50 qd given renal 

function stable


3. Correction of Hypokalemia


4. D/c planning

## 2019-02-02 NOTE — DS
Physical Exam: 


SUBJECTIVE: Patient seen and examined


Patient is feeling better with no acute distress, no shortness of breath. 


OBJECTIVE:





  Vital Signs











Temperature  98.2 F   02/02/19 14:05


 


Pulse Rate  65   02/02/19 14:05


 


Respiratory Rate  18   02/02/19 14:05


 


Blood Pressure  160/111 H  02/02/19 14:05


 


O2 Sat by Pulse Oximetry (%)  97   02/02/19 09:00








 Initial Vital Signs











Temp Pulse Resp BP Pulse Ox


 


 98.7 F   97 H  16   199/111 H  96 


 


 01/30/19 11:05  01/30/19 11:05  01/30/19 11:05  01/30/19 11:05  01/30/19 11:05











PHYSICAL EXAM





GENERAL: The patient is awake, alert, and fully oriented, in no acute distress.


HEAD: Normal with no signs of trauma.


EYES: PERRL, extraocular movements intact, sclera anicteric, conjunctiva clear. 


ENT: Ears normal, nares patent, oropharynx clear without exudates, moist mucous 

membranes.


NECK: Trachea midline, full range of motion, supple. 


LUNGS: Breath sounds equal, clear to auscultation bilaterally, no wheezes, no 

crackles, no accessory muscle use. 


HEART: Regular rate and rhythm, S1, S2 without murmur, rub or gallop.


ABDOMEN: Soft, nontender, nondistended, normoactive bowel sounds, no guarding, 

no rebound, no hepatosplenomegaly, no masses.


EXTREMITIES: 2+ pulses, warm, well-perfused, no edema. 


NEUROLOGICAL: Cranial nerves II through XII grossly intact. Normal speech, gait 

not observed.


PSYCH: Normal mood, normal affect.


SKIN: Warm, dry, normal turgor, no rashes or lesions noted.


LABS


 











WBC  6.1 K/mm3 (4.0-10.0)   01/31/19  05:30    


 


RBC  4.93 M/mm3 (4.00-5.60)   01/31/19  05:30    


 


Hgb  12.3 GM/dL (11.7-16.9)   01/31/19  05:30    


 


Hct  37.0 % (35.4-49)   01/31/19  05:30    


 


MCV  75.2 fl (80-96)  L  01/31/19  05:30    


 


MCHC  33.1 g/dl (32.0-35.9)   01/31/19  05:30    


 


RDW  17.2 % (11.9-15.9)  H  01/31/19  05:30    


 


Plt Count  178 K/MM3 (134-434)   01/31/19  05:30    


 


MPV  8.6 fl (7.5-11.1)   01/31/19  05:30    








 CMP











Sodium  141 mmol/L (136-145)   02/02/19  05:25    


 


Potassium  3.4 mmol/L (3.5-5.1)  L  02/02/19  05:25    


 


Chloride  107 mmol/L ()   02/02/19  05:25    


 


Carbon Dioxide  26 mmol/L (21-32)   02/02/19  05:25    


 


Anion Gap  8 MMOL/L (8-16)   02/02/19  05:25    


 


BUN  26 mg/dL (7-18)  H  02/02/19  05:25    


 


Creatinine  1.9 mg/dL (0.55-1.3)  H  02/02/19  05:25    


 


Creat Clearance w eGFR  35.87  (>60)   02/02/19  05:25    


 


Random Glucose  82 mg/dL ()   02/02/19  05:25    


 


Calcium  8.4 mg/dL (8.5-10.1)  L  02/02/19  05:25    


 


Total Bilirubin  1.1 mg/dL (0.2-1)  H  01/31/19  05:30    


 


AST  21 U/L (15-37)   01/31/19  05:30    


 


ALT  17 U/L (13-61)   01/31/19  05:30    


 


Alkaline Phosphatase  81 U/L ()   01/31/19  05:30    


 


Total Protein  7.0 g/dl (6.4-8.2)   01/31/19  05:30    


 


Albumin  3.3 g/dl (3.4-5.0)  L  01/31/19  05:30    








 CARDIAC ENZYMES











Creatine Kinase  282 U/L ()   01/30/19  22:48    


 


Troponin I  0.14 ng/ml (0.00-0.05)  H  01/31/19  05:30    














Head CT: small chronic left thalamic infarct, small left frontal subcortical 

white matter infarct.


EKG: NSR rate of 89 bpm, LAD, no ST elevation or depression, t wave inversion 

aVL and I, t waves upright





HOSPITAL COURSE:





Date of Admission:01/31/19





Date of Discharge: 02/02/19





Patient is a 64 yo Male with a PMHx of HTN, possible CKD, was sent by his PCP 

since was found to have elevated BP of 204/81 in the office.





# Hypertensive emergency: ON coreg, increased the dose of procardia to 90xl, 

added losartan 50mg po daily, follow with the cardiologist in a week. 


# Elevated Troponin: 0.11--> 0.14. Echo 10/18 unremarkable. EF 70%, cardiology 

on the case. 


#ZACK over CKD: avoid nephrotoxins, stable now, at the base. 


# Hypokalemia 3.4 today ,will give another dose of kdur 40meq before discharge. 





DVT Px: heparin sq. 





Minutes to complete discharge: 35





Discharge Summary


Reason For Visit: HYPERTENSIVE URGENCY


Current Active Problems





Hypertensive emergency (Acute)








Condition: Stable





- Instructions


Diet, Activity, Other Instructions: 


Your visit


You were admitted to the hospital because you were noted to have elevated blood 

pressure.


We have increased your Nifedipine and Carvedilol.


You were also seen by the cardiologist (Dr. Orr) who recommended that you take 

aspirin as well.





Care


-Take your blood pressure measurement every morning.


-Record it and bring it to your doctor's appointment when you follow-up with 

her.


-Eat a healthy diet and drink plenty of water.


-Exercise regularly.





Medications


Please take note of the following changes to your medications


1. Nifedipine 90mg daily.


2. Carvedilol 25mg twice a day.


3. Aspirin 81mg daily.





Follow-up


-Follow-up with your primary care doctor (Dr. Spivey) within 1 week.


-Follow-up with the cardiologist (Dr. Orr) within 2 weeks.





Additional info


Call 911 or go to the ED if with any worsening fever, chills, chest pain, 

shortness of breath, headache, dizziness, nausea, vomiting, abdominal pain, 

diarrhea or any new concerns noted. 


Referrals: 


Lucy Spivey MD [Primary Care Provider] - 1 Week


Kenrick Orr MD [Staff Physician] - 2 Weeks


Disposition: HOME





- Home Medications


Comprehensive Discharge Medication List: 


Ambulatory Orders





Aspirin [ASA -] 81 mg PO DAILY #30 tab.chew 01/31/19 


Carvedilol [Coreg -] 25 mg PO BID #60 tablet 01/31/19 


Nifedipine ER [Procardia XL -] 90 mg PO DAILY #30 tab.er.24 01/31/19 








This patient is new to me today: Yes


Date on this admission: 02/02/19


Emergency Visit: No


Critical Care patient: No





- Discharge Referral


Referred to [facility] No

## 2019-02-02 NOTE — PN
Progress Note (short form)





- Note


Progress Note: 











 Vital Signs











Temperature  98.1 F   02/02/19 09:00


 


Pulse Rate  63   02/02/19 09:00


 


Respiratory Rate  18   02/02/19 09:18


 


Blood Pressure  167/103 H  02/02/19 09:18


 


O2 Sat by Pulse Oximetry (%)  97   02/02/19 09:00








GENERAL: The patient is in no acute distress.


HEAD: Normal with no signs of trauma.


EYES: PERRLA, EOMI, sclera anicteric, conjunctiva clear.


ENT: Ears normal, oropharynx clear without exudates. Moist mucous membranes.


NECK: Normal range of motion, supple without lymphadenopathy, JVD, or masses.


LUNGS: Breath sounds equal, clear to auscultation bilaterally. No wheezes, and 

no crackles.


HEART: KRISTAL 2/6. RRR, normal S1 and S2 without rub or gallop.


ABDOMEN: Soft, nontender, normoactive bowel sounds. No guarding, no rebound. 


EXTREMITIES: Normal range of motion, no edema. No clubbing or cyanosis. No 

erythema, or tenderness.


NEUROLOGICAL: Cranial nerves II through XII grossly intact. Normal speech. No 

focal neurological deficits.


MUSCULOSKELETAL: Back non-tender to palpation, no CVA tenderness


SKIN: Warm, Dry, normal turgor, no rashes or lesions noted.





 CBCD











WBC  6.1 K/mm3 (4.0-10.0)   01/31/19  05:30    


 


RBC  4.93 M/mm3 (4.00-5.60)   01/31/19  05:30    


 


Hgb  12.3 GM/dL (11.7-16.9)   01/31/19  05:30    


 


Hct  37.0 % (35.4-49)   01/31/19  05:30    


 


MCV  75.2 fl (80-96)  L  01/31/19  05:30    


 


MCHC  33.1 g/dl (32.0-35.9)   01/31/19  05:30    


 


RDW  17.2 % (11.9-15.9)  H  01/31/19  05:30    


 


Plt Count  178 K/MM3 (134-434)   01/31/19  05:30    


 


MPV  8.6 fl (7.5-11.1)   01/31/19  05:30    








 CMP











Sodium  141 mmol/L (136-145)   02/02/19  05:25    


 


Potassium  3.4 mmol/L (3.5-5.1)  L  02/02/19  05:25    


 


Chloride  107 mmol/L ()   02/02/19  05:25    


 


Carbon Dioxide  26 mmol/L (21-32)   02/02/19  05:25    


 


Anion Gap  8 MMOL/L (8-16)   02/02/19  05:25    


 


BUN  26 mg/dL (7-18)  H  02/02/19  05:25    


 


Creatinine  1.9 mg/dL (0.55-1.3)  H  02/02/19  05:25    


 


Creat Clearance w eGFR  35.87  (>60)   02/02/19  05:25    


 


Random Glucose  82 mg/dL ()   02/02/19  05:25    


 


Calcium  8.4 mg/dL (8.5-10.1)  L  02/02/19  05:25    


 


Total Bilirubin  1.1 mg/dL (0.2-1)  H  01/31/19  05:30    


 


AST  21 U/L (15-37)   01/31/19  05:30    


 


ALT  17 U/L (13-61)   01/31/19  05:30    


 


Alkaline Phosphatase  81 U/L ()   01/31/19  05:30    


 


Total Protein  7.0 g/dl (6.4-8.2)   01/31/19  05:30    


 


Albumin  3.3 g/dl (3.4-5.0)  L  01/31/19  05:30    








 CARDIAC ENZYMES











Creatine Kinase  282 U/L ()   01/30/19  22:48    


 


Troponin I  0.14 ng/ml (0.00-0.05)  H  01/31/19  05:30    








 Current Medications











Generic Name Dose Route Start Last Admin





  Trade Name Freq  PRN Reason Stop Dose Admin


 


Aspirin  81 mg  01/31/19 11:45  02/02/19 09:46





  Asa -  PO   81 mg





  DAILY GUALBERTO   Administration





     





     





     





     


 


Carvedilol  25 mg  01/31/19 11:31  02/02/19 09:46





  Coreg -  PO   25 mg





  BID GUALBERTO   Administration





     





     





     





     


 


Heparin Sodium (Porcine)  5,000 unit  01/31/19 14:00  02/02/19 06:45





  Heparin -  SQ   5,000 unit





  TID GUALBERTO   Administration





     





     





     





     


 


Losartan Potassium  50 mg  02/02/19 10:00  02/02/19 09:47





  Cozaar -  PO   50 mg





  DAILY GUALBERTO   Administration





     





     





     





     


 


Nifedipine  90 mg  01/31/19 10:00  02/02/19 09:46





  Procardia Xl -  PO   90 mg





  DAILY GUALBERTO   Administration





     





     





     





     


 


Potassium Chloride  40 meq  02/02/19 10:00  02/02/19 09:46





  K-Dur -  PO   40 meq





  DAILY GUALBERTO   Administration





     





     





     





     








 Home Medications











 Medication  Instructions  Recorded


 


Aspirin [ASA -] 81 mg PO DAILY #30 tab.chew 01/31/19


 


Carvedilol [Coreg -] 25 mg PO BID #60 tablet 01/31/19


 


Nifedipine ER [Procardia XL -] 90 mg PO DAILY #30 tab.er.24 01/31/19








Head CT: small chronic left thalamic infarct, small left frontal subcortical 

white matter infarct.


EKG: NSR rate of 89 bpm, LAD, no ST elevation or depression, t wave inversion 

aVL and I, t waves upright





Assessment and plan:


Patient is a 62 yo Male with a PMHx of HTN, possible CKD, was sent by his PCP 

since was found to have elevated BP of 204/81 in the office.





# Hypertensive emergency: ON coreg, increased the dose of procardia to 90xl, 

added losartan 50mg po daily, will continue to monitor the blood pressure. 


# Elevated Troponin: 0.11--> 0.14. Echo 10/18 unremarkable. EF 70%, cardiology 

on the case. 


#ZACK over CKD: avoid nephrotoxins, stable now, at the base. 


# Hypokalemia will replete. 





DVT Px: heparin sq.

## 2021-10-30 ENCOUNTER — HOSPITAL ENCOUNTER (INPATIENT)
Dept: HOSPITAL 74 - JER | Age: 66
LOS: 4 days | Discharge: HOME | DRG: 351 | End: 2021-11-03
Attending: GENERAL ACUTE CARE HOSPITAL | Admitting: INTERNAL MEDICINE
Payer: COMMERCIAL

## 2021-10-30 VITALS — BODY MASS INDEX: 29.3 KG/M2

## 2021-10-30 DIAGNOSIS — E87.6: ICD-10-CM

## 2021-10-30 DIAGNOSIS — N40.0: ICD-10-CM

## 2021-10-30 DIAGNOSIS — L03.115: ICD-10-CM

## 2021-10-30 DIAGNOSIS — N30.91: ICD-10-CM

## 2021-10-30 DIAGNOSIS — M25.571: ICD-10-CM

## 2021-10-30 DIAGNOSIS — Z16.12: ICD-10-CM

## 2021-10-30 DIAGNOSIS — M10.9: Primary | ICD-10-CM

## 2021-10-30 DIAGNOSIS — B96.89: ICD-10-CM

## 2021-10-30 DIAGNOSIS — I12.9: ICD-10-CM

## 2021-10-30 DIAGNOSIS — N18.30: ICD-10-CM

## 2021-10-30 LAB
ALBUMIN SERPL-MCNC: 3.2 G/DL (ref 3.4–5)
ALP SERPL-CCNC: 98 U/L (ref 45–117)
ALT SERPL-CCNC: 17 U/L (ref 13–61)
ANION GAP SERPL CALC-SCNC: 7 MMOL/L (ref 8–16)
APPEARANCE UR: CLEAR
AST SERPL-CCNC: 21 U/L (ref 15–37)
BACTERIA # UR AUTO: 2080 /UL (ref 0–1359)
BASOPHILS # BLD: 0.8 % (ref 0–2)
BILIRUB SERPL-MCNC: 0.4 MG/DL (ref 0.2–1)
BILIRUB UR STRIP.AUTO-MCNC: NEGATIVE MG/DL
BUN SERPL-MCNC: 26.6 MG/DL (ref 7–18)
CALCIUM SERPL-MCNC: 9.6 MG/DL (ref 8.5–10.1)
CASTS URNS QL MICRO: 1 /UL (ref 0–3.1)
CHLORIDE SERPL-SCNC: 108 MMOL/L (ref 98–107)
CO2 SERPL-SCNC: 25 MMOL/L (ref 21–32)
COLOR UR: YELLOW
CREAT SERPL-MCNC: 1.8 MG/DL (ref 0.55–1.3)
DEPRECATED RDW RBC AUTO: 17 % (ref 11.9–15.9)
EOSINOPHIL # BLD: 0.9 % (ref 0–4.5)
EPITH CASTS URNS QL MICRO: 1 /UL (ref 0–25.1)
ERYTHROCYTE [SEDIMENTATION RATE] IN BLOOD BY WESTERGREN METHOD: 81 MM/HR (ref 0–20)
GLUCOSE SERPL-MCNC: 119 MG/DL (ref 74–106)
HCT VFR BLD CALC: 36.8 % (ref 35.4–49)
HGB BLD-MCNC: 12 GM/DL (ref 11.7–16.9)
KETONES UR QL STRIP: NEGATIVE
LEUKOCYTE ESTERASE UR QL STRIP.AUTO: (no result)
LYMPHOCYTES # BLD: 14.9 % (ref 8–40)
MAGNESIUM SERPL-MCNC: 2.1 MG/DL (ref 1.8–2.4)
MCH RBC QN AUTO: 24.8 PG (ref 25.7–33.7)
MCHC RBC AUTO-ENTMCNC: 32.6 G/DL (ref 32–35.9)
MCV RBC: 76 FL (ref 80–96)
MONOCYTES # BLD AUTO: 6.9 % (ref 3.8–10.2)
NEUTROPHILS # BLD: 76.5 % (ref 42.8–82.8)
NITRITE UR QL STRIP: NEGATIVE
PH UR: 6 [PH] (ref 5–8)
PLATELET # BLD AUTO: 253 10^3/UL (ref 134–434)
PMV BLD: 8.1 FL (ref 7.5–11.1)
PROT SERPL-MCNC: 8 G/DL (ref 6.4–8.2)
PROT UR QL STRIP: (no result)
PROT UR QL STRIP: NEGATIVE
RBC # BLD AUTO: 2357 /UL (ref 0–23.9)
RBC # BLD AUTO: 4.84 M/MM3 (ref 4–5.6)
SODIUM SERPL-SCNC: 141 MMOL/L (ref 136–145)
SP GR UR: 1.02 (ref 1.01–1.03)
URATE SERPL-SCNC: 7 MG/DL (ref 2.6–7.2)
UROBILINOGEN UR STRIP-MCNC: 1 MG/DL (ref 0.2–1)
WBC # BLD AUTO: 9.1 K/MM3 (ref 4–10)
WBC # UR AUTO: 226 /UL (ref 0–25.8)

## 2021-10-30 PROCEDURE — U0005 INFEC AGEN DETEC AMPLI PROBE: HCPCS

## 2021-10-30 PROCEDURE — U0003 INFECTIOUS AGENT DETECTION BY NUCLEIC ACID (DNA OR RNA); SEVERE ACUTE RESPIRATORY SYNDROME CORONAVIRUS 2 (SARS-COV-2) (CORONAVIRUS DISEASE [COVID-19]), AMPLIFIED PROBE TECHNIQUE, MAKING USE OF HIGH THROUGHPUT TECHNOLOGIES AS DESCRIBED BY CMS-2020-01-R: HCPCS

## 2021-10-30 PROCEDURE — C9803 HOPD COVID-19 SPEC COLLECT: HCPCS

## 2021-10-30 RX ADMIN — HEPARIN SODIUM SCH UNIT: 5000 INJECTION, SOLUTION INTRAVENOUS; SUBCUTANEOUS at 23:34

## 2021-10-31 LAB
ANION GAP SERPL CALC-SCNC: 5 MMOL/L (ref 8–16)
ANION GAP SERPL CALC-SCNC: 6 MMOL/L (ref 8–16)
BASOPHILS # BLD: 0.4 % (ref 0–2)
BUN SERPL-MCNC: 17.4 MG/DL (ref 7–18)
BUN SERPL-MCNC: 20 MG/DL (ref 7–18)
CALCIUM SERPL-MCNC: 8.8 MG/DL (ref 8.5–10.1)
CALCIUM SERPL-MCNC: 9 MG/DL (ref 8.5–10.1)
CHLORIDE SERPL-SCNC: 107 MMOL/L (ref 98–107)
CHLORIDE SERPL-SCNC: 111 MMOL/L (ref 98–107)
CO2 SERPL-SCNC: 28 MMOL/L (ref 21–32)
CO2 SERPL-SCNC: 29 MMOL/L (ref 21–32)
CREAT SERPL-MCNC: 1.6 MG/DL (ref 0.55–1.3)
CREAT SERPL-MCNC: 1.6 MG/DL (ref 0.55–1.3)
DEPRECATED RDW RBC AUTO: 17 % (ref 11.9–15.9)
EOSINOPHIL # BLD: 0.9 % (ref 0–4.5)
GLUCOSE SERPL-MCNC: 104 MG/DL (ref 74–106)
GLUCOSE SERPL-MCNC: 111 MG/DL (ref 74–106)
HCT VFR BLD CALC: 35.1 % (ref 35.4–49)
HGB BLD-MCNC: 11.4 GM/DL (ref 11.7–16.9)
LYMPHOCYTES # BLD: 15.9 % (ref 8–40)
MAGNESIUM SERPL-MCNC: 2.4 MG/DL (ref 1.8–2.4)
MCH RBC QN AUTO: 24.8 PG (ref 25.7–33.7)
MCHC RBC AUTO-ENTMCNC: 32.6 G/DL (ref 32–35.9)
MCV RBC: 76.1 FL (ref 80–96)
MONOCYTES # BLD AUTO: 7 % (ref 3.8–10.2)
NEUTROPHILS # BLD: 75.8 % (ref 42.8–82.8)
PHOSPHATE SERPL-MCNC: 1.3 MG/DL (ref 2.5–4.9)
PLATELET # BLD AUTO: 237 10^3/UL (ref 134–434)
PMV BLD: 8.5 FL (ref 7.5–11.1)
RBC # BLD AUTO: 4.61 M/MM3 (ref 4–5.6)
SODIUM SERPL-SCNC: 141 MMOL/L (ref 136–145)
SODIUM SERPL-SCNC: 145 MMOL/L (ref 136–145)
WBC # BLD AUTO: 8.1 K/MM3 (ref 4–10)

## 2021-10-31 RX ADMIN — ACETAMINOPHEN PRN MG: 10 INJECTION, SOLUTION INTRAVENOUS at 18:38

## 2021-10-31 RX ADMIN — ACETAMINOPHEN PRN MG: 325 TABLET ORAL at 13:35

## 2021-10-31 RX ADMIN — HEPARIN SODIUM SCH UNIT: 5000 INJECTION, SOLUTION INTRAVENOUS; SUBCUTANEOUS at 14:42

## 2021-10-31 RX ADMIN — HEPARIN SODIUM SCH UNIT: 5000 INJECTION, SOLUTION INTRAVENOUS; SUBCUTANEOUS at 05:44

## 2021-10-31 RX ADMIN — HEPARIN SODIUM SCH UNIT: 5000 INJECTION, SOLUTION INTRAVENOUS; SUBCUTANEOUS at 21:08

## 2021-10-31 RX ADMIN — PANTOPRAZOLE SODIUM SCH MG: 40 TABLET, DELAYED RELEASE ORAL at 12:46

## 2021-10-31 RX ADMIN — ASPIRIN SCH MG: 81 TABLET, COATED ORAL at 11:25

## 2021-10-31 RX ADMIN — NIFEDIPINE SCH MG: 90 TABLET, FILM COATED, EXTENDED RELEASE ORAL at 11:26

## 2021-10-31 RX ADMIN — PREDNISONE SCH MG: 10 TABLET ORAL at 12:45

## 2021-10-31 RX ADMIN — CEFTRIAXONE SCH MLS/HR: 1 INJECTION, POWDER, FOR SOLUTION INTRAMUSCULAR; INTRAVENOUS at 11:26

## 2021-10-31 RX ADMIN — ACETAMINOPHEN PRN MG: 325 TABLET ORAL at 01:08

## 2021-11-01 LAB
ALBUMIN SERPL-MCNC: 2.6 G/DL (ref 3.4–5)
ALP SERPL-CCNC: 83 U/L (ref 45–117)
ALT SERPL-CCNC: 16 U/L (ref 13–61)
ANION GAP SERPL CALC-SCNC: 11 MMOL/L (ref 8–16)
APPEARANCE UR: CLEAR
AST SERPL-CCNC: 19 U/L (ref 15–37)
BACTERIA # UR AUTO: 25 /UL (ref 0–1359)
BASOPHILS # BLD: 0.5 % (ref 0–2)
BILIRUB SERPL-MCNC: 0.5 MG/DL (ref 0.2–1)
BILIRUB UR STRIP.AUTO-MCNC: NEGATIVE MG/DL
BUN SERPL-MCNC: 21.7 MG/DL (ref 7–18)
CALCIUM SERPL-MCNC: 9.6 MG/DL (ref 8.5–10.1)
CASTS URNS QL MICRO: 3 /UL (ref 0–3.1)
CHLORIDE SERPL-SCNC: 104 MMOL/L (ref 98–107)
CO2 SERPL-SCNC: 23 MMOL/L (ref 21–32)
COLOR UR: YELLOW
CREAT SERPL-MCNC: 1.7 MG/DL (ref 0.55–1.3)
DEPRECATED RDW RBC AUTO: 17.7 % (ref 11.9–15.9)
EOSINOPHIL # BLD: 0.2 % (ref 0–4.5)
EPITH CASTS URNS QL MICRO: 16 /UL (ref 0–25.1)
GLUCOSE SERPL-MCNC: 136 MG/DL (ref 74–106)
HCT VFR BLD CALC: 33.5 % (ref 35.4–49)
HGB BLD-MCNC: 10.8 GM/DL (ref 11.7–16.9)
KETONES UR QL STRIP: NEGATIVE
LEUKOCYTE ESTERASE UR QL STRIP.AUTO: NEGATIVE
LYMPHOCYTES # BLD: 13.1 % (ref 8–40)
MAGNESIUM SERPL-MCNC: 2.1 MG/DL (ref 1.8–2.4)
MCH RBC QN AUTO: 24.8 PG (ref 25.7–33.7)
MCHC RBC AUTO-ENTMCNC: 32.4 G/DL (ref 32–35.9)
MCV RBC: 76.5 FL (ref 80–96)
MONOCYTES # BLD AUTO: 6.6 % (ref 3.8–10.2)
NEUTROPHILS # BLD: 79.6 % (ref 42.8–82.8)
NITRITE UR QL STRIP: NEGATIVE
PH UR: 6 [PH] (ref 5–8)
PHOSPHATE SERPL-MCNC: 3 MG/DL (ref 2.5–4.9)
PLATELET # BLD AUTO: 252 10^3/UL (ref 134–434)
PMV BLD: 8.9 FL (ref 7.5–11.1)
PROT SERPL-MCNC: 7 G/DL (ref 6.4–8.2)
PROT UR QL STRIP: (no result)
PROT UR QL STRIP: (no result)
RBC # BLD AUTO: 4.38 M/MM3 (ref 4–5.6)
RBC # BLD AUTO: 949 /UL (ref 0–23.9)
SODIUM SERPL-SCNC: 138 MMOL/L (ref 136–145)
SP GR UR: 1.02 (ref 1.01–1.03)
UROBILINOGEN UR STRIP-MCNC: 0.2 MG/DL (ref 0.2–1)
WBC # BLD AUTO: 11.3 K/MM3 (ref 4–10)
WBC # UR AUTO: 47 /UL (ref 0–25.8)

## 2021-11-01 RX ADMIN — POTASSIUM CHLORIDE SCH MEQ: 1500 TABLET, EXTENDED RELEASE ORAL at 15:24

## 2021-11-01 RX ADMIN — NIFEDIPINE SCH MG: 90 TABLET, FILM COATED, EXTENDED RELEASE ORAL at 09:46

## 2021-11-01 RX ADMIN — ERTAPENEM SODIUM SCH MLS/HR: 1 INJECTION, POWDER, LYOPHILIZED, FOR SOLUTION INTRAMUSCULAR; INTRAVENOUS at 17:15

## 2021-11-01 RX ADMIN — HEPARIN SODIUM SCH UNIT: 5000 INJECTION, SOLUTION INTRAVENOUS; SUBCUTANEOUS at 21:45

## 2021-11-01 RX ADMIN — PANTOPRAZOLE SODIUM SCH MG: 40 TABLET, DELAYED RELEASE ORAL at 09:46

## 2021-11-01 RX ADMIN — POTASSIUM CHLORIDE SCH MEQ: 1500 TABLET, EXTENDED RELEASE ORAL at 13:27

## 2021-11-01 RX ADMIN — HEPARIN SODIUM SCH UNIT: 5000 INJECTION, SOLUTION INTRAVENOUS; SUBCUTANEOUS at 05:00

## 2021-11-01 RX ADMIN — ACETAMINOPHEN PRN MG: 10 INJECTION, SOLUTION INTRAVENOUS at 06:19

## 2021-11-01 RX ADMIN — ACETAMINOPHEN PRN MG: 10 INJECTION, SOLUTION INTRAVENOUS at 15:44

## 2021-11-01 RX ADMIN — ASPIRIN SCH MG: 81 TABLET, COATED ORAL at 09:46

## 2021-11-01 RX ADMIN — HEPARIN SODIUM SCH UNIT: 5000 INJECTION, SOLUTION INTRAVENOUS; SUBCUTANEOUS at 13:27

## 2021-11-01 RX ADMIN — PREDNISONE SCH MG: 10 TABLET ORAL at 09:46

## 2021-11-01 RX ADMIN — CEFTRIAXONE SCH MLS/HR: 1 INJECTION, POWDER, FOR SOLUTION INTRAMUSCULAR; INTRAVENOUS at 09:46

## 2021-11-02 LAB
ANION GAP SERPL CALC-SCNC: 9 MMOL/L (ref 8–16)
BUN SERPL-MCNC: 23.5 MG/DL (ref 7–18)
CALCIUM SERPL-MCNC: 9.2 MG/DL (ref 8.5–10.1)
CHLORIDE SERPL-SCNC: 104 MMOL/L (ref 98–107)
CO2 SERPL-SCNC: 27 MMOL/L (ref 21–32)
CREAT SERPL-MCNC: 1.7 MG/DL (ref 0.55–1.3)
DEPRECATED RDW RBC AUTO: 17 % (ref 11.9–15.9)
GLUCOSE SERPL-MCNC: 86 MG/DL (ref 74–106)
HCT VFR BLD CALC: 34.5 % (ref 35.4–49)
HGB BLD-MCNC: 11.6 GM/DL (ref 11.7–16.9)
MAGNESIUM SERPL-MCNC: 2.2 MG/DL (ref 1.8–2.4)
MCH RBC QN AUTO: 25.2 PG (ref 25.7–33.7)
MCHC RBC AUTO-ENTMCNC: 33.7 G/DL (ref 32–35.9)
MCV RBC: 74.8 FL (ref 80–96)
PHOSPHATE SERPL-MCNC: 3.2 MG/DL (ref 2.5–4.9)
PLATELET # BLD AUTO: 265 10^3/UL (ref 134–434)
PMV BLD: 8.5 FL (ref 7.5–11.1)
RBC # BLD AUTO: 4.61 M/MM3 (ref 4–5.6)
SODIUM SERPL-SCNC: 141 MMOL/L (ref 136–145)
WBC # BLD AUTO: 10.9 K/MM3 (ref 4–10)

## 2021-11-02 RX ADMIN — COLCHICINE SCH MG: 0.6 CAPSULE ORAL at 16:39

## 2021-11-02 RX ADMIN — PREDNISONE SCH MG: 10 TABLET ORAL at 10:55

## 2021-11-02 RX ADMIN — LABETALOL HYDROCHLORIDE SCH MG: 200 TABLET, FILM COATED ORAL at 16:20

## 2021-11-02 RX ADMIN — ASPIRIN SCH MG: 81 TABLET, COATED ORAL at 10:55

## 2021-11-02 RX ADMIN — LABETALOL HYDROCHLORIDE SCH MG: 200 TABLET, FILM COATED ORAL at 22:08

## 2021-11-02 RX ADMIN — LOSARTAN POTASSIUM SCH MG: 50 TABLET, FILM COATED ORAL at 10:56

## 2021-11-02 RX ADMIN — HEPARIN SODIUM SCH UNIT: 5000 INJECTION, SOLUTION INTRAVENOUS; SUBCUTANEOUS at 16:20

## 2021-11-02 RX ADMIN — HEPARIN SODIUM SCH UNIT: 5000 INJECTION, SOLUTION INTRAVENOUS; SUBCUTANEOUS at 22:08

## 2021-11-02 RX ADMIN — NIFEDIPINE SCH MG: 90 TABLET, FILM COATED, EXTENDED RELEASE ORAL at 10:55

## 2021-11-02 RX ADMIN — HEPARIN SODIUM SCH UNIT: 5000 INJECTION, SOLUTION INTRAVENOUS; SUBCUTANEOUS at 05:46

## 2021-11-02 RX ADMIN — TAMSULOSIN HYDROCHLORIDE SCH MG: 0.4 CAPSULE ORAL at 10:55

## 2021-11-02 RX ADMIN — PANTOPRAZOLE SODIUM SCH MG: 40 TABLET, DELAYED RELEASE ORAL at 10:55

## 2021-11-02 RX ADMIN — ERTAPENEM SODIUM SCH MLS/HR: 1 INJECTION, POWDER, LYOPHILIZED, FOR SOLUTION INTRAMUSCULAR; INTRAVENOUS at 10:54

## 2021-11-03 VITALS — TEMPERATURE: 98.6 F | DIASTOLIC BLOOD PRESSURE: 93 MMHG | SYSTOLIC BLOOD PRESSURE: 174 MMHG | HEART RATE: 78 BPM

## 2021-11-03 LAB
ANION GAP SERPL CALC-SCNC: 9 MMOL/L (ref 8–16)
BUN SERPL-MCNC: 28 MG/DL (ref 7–18)
CALCIUM SERPL-MCNC: 9.1 MG/DL (ref 8.5–10.1)
CHLORIDE SERPL-SCNC: 104 MMOL/L (ref 98–107)
CO2 SERPL-SCNC: 26 MMOL/L (ref 21–32)
CREAT SERPL-MCNC: 1.9 MG/DL (ref 0.55–1.3)
DEPRECATED RDW RBC AUTO: 16.8 % (ref 11.9–15.9)
GLUCOSE SERPL-MCNC: 100 MG/DL (ref 74–106)
HCT VFR BLD CALC: 32 % (ref 35.4–49)
HGB BLD-MCNC: 10.9 GM/DL (ref 11.7–16.9)
MCH RBC QN AUTO: 25 PG (ref 25.7–33.7)
MCHC RBC AUTO-ENTMCNC: 33.9 G/DL (ref 32–35.9)
MCV RBC: 73.6 FL (ref 80–96)
PLATELET # BLD AUTO: 275 10^3/UL (ref 134–434)
PMV BLD: 8.1 FL (ref 7.5–11.1)
RBC # BLD AUTO: 4.35 M/MM3 (ref 4–5.6)
SODIUM SERPL-SCNC: 139 MMOL/L (ref 136–145)
WBC # BLD AUTO: 10.5 K/MM3 (ref 4–10)

## 2021-11-03 RX ADMIN — LOSARTAN POTASSIUM SCH MG: 50 TABLET, FILM COATED ORAL at 09:08

## 2021-11-03 RX ADMIN — PREDNISONE SCH MG: 10 TABLET ORAL at 09:07

## 2021-11-03 RX ADMIN — TAMSULOSIN HYDROCHLORIDE SCH MG: 0.4 CAPSULE ORAL at 09:06

## 2021-11-03 RX ADMIN — NIFEDIPINE SCH MG: 90 TABLET, FILM COATED, EXTENDED RELEASE ORAL at 09:06

## 2021-11-03 RX ADMIN — ERTAPENEM SODIUM SCH MLS/HR: 1 INJECTION, POWDER, LYOPHILIZED, FOR SOLUTION INTRAMUSCULAR; INTRAVENOUS at 09:21

## 2021-11-03 RX ADMIN — COLCHICINE SCH MG: 0.6 CAPSULE ORAL at 09:07

## 2021-11-03 RX ADMIN — HEPARIN SODIUM SCH UNIT: 5000 INJECTION, SOLUTION INTRAVENOUS; SUBCUTANEOUS at 05:39

## 2021-11-03 RX ADMIN — PANTOPRAZOLE SODIUM SCH MG: 40 TABLET, DELAYED RELEASE ORAL at 09:07

## 2021-11-03 RX ADMIN — HEPARIN SODIUM SCH UNIT: 5000 INJECTION, SOLUTION INTRAVENOUS; SUBCUTANEOUS at 14:58

## 2021-11-03 RX ADMIN — LABETALOL HYDROCHLORIDE SCH MG: 200 TABLET, FILM COATED ORAL at 09:07

## 2021-11-03 RX ADMIN — ASPIRIN SCH MG: 81 TABLET, COATED ORAL at 09:07

## 2021-12-16 ENCOUNTER — HOSPITAL ENCOUNTER (INPATIENT)
Dept: HOSPITAL 74 - JER | Age: 66
LOS: 6 days | Discharge: HOME | DRG: 351 | End: 2021-12-22
Attending: GENERAL ACUTE CARE HOSPITAL | Admitting: STUDENT IN AN ORGANIZED HEALTH CARE EDUCATION/TRAINING PROGRAM
Payer: COMMERCIAL

## 2021-12-16 VITALS — BODY MASS INDEX: 26.1 KG/M2

## 2021-12-16 DIAGNOSIS — M25.461: ICD-10-CM

## 2021-12-16 DIAGNOSIS — I12.9: ICD-10-CM

## 2021-12-16 DIAGNOSIS — M10.061: Primary | ICD-10-CM

## 2021-12-16 DIAGNOSIS — M10.9: ICD-10-CM

## 2021-12-16 DIAGNOSIS — D50.9: ICD-10-CM

## 2021-12-16 DIAGNOSIS — L03.90: ICD-10-CM

## 2021-12-16 DIAGNOSIS — E87.6: ICD-10-CM

## 2021-12-16 DIAGNOSIS — N40.0: ICD-10-CM

## 2021-12-16 DIAGNOSIS — E78.5: ICD-10-CM

## 2021-12-16 DIAGNOSIS — N18.4: ICD-10-CM

## 2021-12-16 DIAGNOSIS — N39.0: ICD-10-CM

## 2021-12-16 DIAGNOSIS — K21.9: ICD-10-CM

## 2021-12-16 DIAGNOSIS — M25.561: ICD-10-CM

## 2021-12-16 LAB
ALBUMIN SERPL-MCNC: 3.1 G/DL (ref 3.4–5)
ALP SERPL-CCNC: 84 U/L (ref 45–117)
ALT SERPL-CCNC: 12 U/L (ref 13–61)
ANION GAP SERPL CALC-SCNC: 9 MMOL/L (ref 8–16)
APPEARANCE UR: CLEAR
APTT BLD: 36.3 SECONDS (ref 25.2–36.5)
AST SERPL-CCNC: 22 U/L (ref 15–37)
BACTERIA # UR AUTO: 614 /UL (ref 0–1359)
BASOPHILS # BLD: 0.5 % (ref 0–2)
BILIRUB SERPL-MCNC: 1.6 MG/DL (ref 0.2–1)
BILIRUB UR STRIP.AUTO-MCNC: NEGATIVE MG/DL
BUN SERPL-MCNC: 31.5 MG/DL (ref 7–18)
CALCIUM SERPL-MCNC: 9.2 MG/DL (ref 8.5–10.1)
CASTS URNS QL MICRO: 1 /UL (ref 0–3.1)
CHLORIDE SERPL-SCNC: 98 MMOL/L (ref 98–107)
CO2 SERPL-SCNC: 32 MMOL/L (ref 21–32)
COLOR UR: YELLOW
CREAT SERPL-MCNC: 2.4 MG/DL (ref 0.55–1.3)
CRYSTALS SNV MICRO: NEGATIVE
DEPRECATED RDW RBC AUTO: 17.8 % (ref 11.9–15.9)
EOSINOPHIL # BLD: 0.5 % (ref 0–4.5)
EPITH CASTS URNS QL MICRO: 1 /UL (ref 0–25.1)
ERYTHROCYTE [SEDIMENTATION RATE] IN BLOOD BY WESTERGREN METHOD: 86 MM/HR (ref 0–20)
GLUCOSE SERPL-MCNC: 98 MG/DL (ref 74–106)
HCT VFR BLD CALC: 34.9 % (ref 35.4–49)
HGB BLD-MCNC: 11.4 GM/DL (ref 11.7–16.9)
INR BLD: 1.14 (ref 0.83–1.09)
KETONES UR QL STRIP: NEGATIVE
LEUKOCYTE ESTERASE UR QL STRIP.AUTO: (no result)
LYMPHOCYTES # BLD: 15.9 % (ref 8–40)
MACROPHAGES NFR PLR MANUAL: 2 %
MAGNESIUM SERPL-MCNC: 2.4 MG/DL (ref 1.8–2.4)
MCH RBC QN AUTO: 25 PG (ref 25.7–33.7)
MCHC RBC AUTO-ENTMCNC: 32.7 G/DL (ref 32–35.9)
MCV RBC: 76.4 FL (ref 80–96)
MONOCYTES # BLD AUTO: 13.5 % (ref 3.8–10.2)
MONOCYTES NFR PLR MANUAL: 1 %
NEUTROPHILS # BLD: 69.6 % (ref 42.8–82.8)
NITRITE UR QL STRIP: NEGATIVE
NUC CELL # FLD: (no result) /MM3
PH UR: 6 [PH] (ref 5–8)
PLATELET # BLD AUTO: 200 10^3/UL (ref 134–434)
PMV BLD: 8.5 FL (ref 7.5–11.1)
PROT SERPL-MCNC: 7.2 G/DL (ref 6.4–8.2)
PROT UR QL STRIP: (no result)
PROT UR QL STRIP: NEGATIVE
PT PNL PPP: 13.3 SEC (ref 9.7–13)
RBC # BLD AUTO: 4.56 M/MM3 (ref 4–5.6)
RBC # BLD AUTO: 77 /UL (ref 0–23.9)
SODIUM SERPL-SCNC: 139 MMOL/L (ref 136–145)
SP GR UR: 1.02 (ref 1.01–1.03)
URATE SERPL-SCNC: 10.1 MG/DL (ref 2.6–7.2)
UROBILINOGEN UR STRIP-MCNC: 0.2 MG/DL (ref 0.2–1)
WBC # BLD AUTO: 9.4 K/MM3 (ref 4–10)
WBC # UR AUTO: 135 /UL (ref 0–25.8)

## 2021-12-16 PROCEDURE — C9803 HOPD COVID-19 SPEC COLLECT: HCPCS

## 2021-12-16 PROCEDURE — U0003 INFECTIOUS AGENT DETECTION BY NUCLEIC ACID (DNA OR RNA); SEVERE ACUTE RESPIRATORY SYNDROME CORONAVIRUS 2 (SARS-COV-2) (CORONAVIRUS DISEASE [COVID-19]), AMPLIFIED PROBE TECHNIQUE, MAKING USE OF HIGH THROUGHPUT TECHNOLOGIES AS DESCRIBED BY CMS-2020-01-R: HCPCS

## 2021-12-16 PROCEDURE — G0480 DRUG TEST DEF 1-7 CLASSES: HCPCS

## 2021-12-16 PROCEDURE — U0005 INFEC AGEN DETEC AMPLI PROBE: HCPCS

## 2021-12-16 RX ADMIN — POTASSIUM CHLORIDE SCH MLS/HR: 7.46 INJECTION, SOLUTION INTRAVENOUS at 23:27

## 2021-12-16 RX ADMIN — POTASSIUM CHLORIDE SCH MLS/HR: 7.46 INJECTION, SOLUTION INTRAVENOUS at 20:58

## 2021-12-17 LAB
ALBUMIN SERPL-MCNC: 2.7 G/DL (ref 3.4–5)
ALP SERPL-CCNC: 85 U/L (ref 45–117)
ALT SERPL-CCNC: 13 U/L (ref 13–61)
ANION GAP SERPL CALC-SCNC: 7 MMOL/L (ref 8–16)
AST SERPL-CCNC: 36 U/L (ref 15–37)
BASOPHILS # BLD: 0.2 % (ref 0–2)
BILIRUB SERPL-MCNC: 1.8 MG/DL (ref 0.2–1)
BUN SERPL-MCNC: 26.8 MG/DL (ref 7–18)
CALCIUM SERPL-MCNC: 9 MG/DL (ref 8.5–10.1)
CHLORIDE SERPL-SCNC: 100 MMOL/L (ref 98–107)
CO2 SERPL-SCNC: 31 MMOL/L (ref 21–32)
CREAT SERPL-MCNC: 2.3 MG/DL (ref 0.55–1.3)
DEPRECATED RDW RBC AUTO: 17.7 % (ref 11.9–15.9)
EOSINOPHIL # BLD: 0.2 % (ref 0–4.5)
GLUCOSE SERPL-MCNC: 161 MG/DL (ref 74–106)
HCT VFR BLD CALC: 34 % (ref 35.4–49)
HGB BLD-MCNC: 11.2 GM/DL (ref 11.7–16.9)
LACTATE SERPL-MCNC: 2.1 MMOL/L (ref 0.4–2)
LYMPHOCYTES # BLD: 7.8 % (ref 8–40)
MCH RBC QN AUTO: 25.1 PG (ref 25.7–33.7)
MCHC RBC AUTO-ENTMCNC: 32.8 G/DL (ref 32–35.9)
MCV RBC: 76.4 FL (ref 80–96)
MONOCYTES # BLD AUTO: 8.9 % (ref 3.8–10.2)
NEUTROPHILS # BLD: 82.9 % (ref 42.8–82.8)
PLATELET # BLD AUTO: 181 10^3/UL (ref 134–434)
PMV BLD: 8.7 FL (ref 7.5–11.1)
PROT SERPL-MCNC: 7 G/DL (ref 6.4–8.2)
RBC # BLD AUTO: 4.45 M/MM3 (ref 4–5.6)
SODIUM SERPL-SCNC: 137 MMOL/L (ref 136–145)
URATE SERPL-SCNC: 8.8 MG/DL (ref 2.6–7.2)
WBC # BLD AUTO: 10.1 K/MM3 (ref 4–10)

## 2021-12-17 RX ADMIN — LABETALOL HCL SCH MG: 100 TABLET, FILM COATED ORAL at 09:06

## 2021-12-17 RX ADMIN — HEPARIN SODIUM SCH UNIT: 5000 INJECTION, SOLUTION INTRAVENOUS; SUBCUTANEOUS at 16:30

## 2021-12-17 RX ADMIN — PIPERACILLIN SODIUM AND TAZOBACTAM SODIUM SCH MLS/HR: .25; 2 INJECTION, POWDER, LYOPHILIZED, FOR SOLUTION INTRAVENOUS at 18:35

## 2021-12-17 RX ADMIN — TAMSULOSIN HYDROCHLORIDE SCH MG: 0.4 CAPSULE ORAL at 09:06

## 2021-12-17 RX ADMIN — PIPERACILLIN SODIUM AND TAZOBACTAM SODIUM SCH MLS/HR: .25; 2 INJECTION, POWDER, LYOPHILIZED, FOR SOLUTION INTRAVENOUS at 12:53

## 2021-12-17 RX ADMIN — LABETALOL HCL SCH MG: 100 TABLET, FILM COATED ORAL at 22:47

## 2021-12-17 RX ADMIN — HEPARIN SODIUM SCH UNIT: 5000 INJECTION, SOLUTION INTRAVENOUS; SUBCUTANEOUS at 22:47

## 2021-12-17 RX ADMIN — VANCOMYCIN HYDROCHLORIDE SCH MLS/HR: 1 INJECTION, POWDER, LYOPHILIZED, FOR SOLUTION INTRAVENOUS at 16:29

## 2021-12-17 RX ADMIN — PANTOPRAZOLE SODIUM SCH MG: 40 TABLET, DELAYED RELEASE ORAL at 09:06

## 2021-12-18 LAB
ALBUMIN SERPL-MCNC: 2.3 G/DL (ref 3.4–5)
ALP SERPL-CCNC: 84 U/L (ref 45–117)
ALT SERPL-CCNC: 18 U/L (ref 13–61)
ANION GAP SERPL CALC-SCNC: 8 MMOL/L (ref 8–16)
AST SERPL-CCNC: 29 U/L (ref 15–37)
BILIRUB SERPL-MCNC: 1.6 MG/DL (ref 0.2–1)
BUN SERPL-MCNC: 20.9 MG/DL (ref 7–18)
CALCIUM SERPL-MCNC: 8.5 MG/DL (ref 8.5–10.1)
CHLORIDE SERPL-SCNC: 105 MMOL/L (ref 98–107)
CO2 SERPL-SCNC: 28 MMOL/L (ref 21–32)
CREAT SERPL-MCNC: 2.2 MG/DL (ref 0.55–1.3)
DEPRECATED RDW RBC AUTO: 17.6 % (ref 11.9–15.9)
ERYTHROCYTE [SEDIMENTATION RATE] IN BLOOD BY WESTERGREN METHOD: 85 MM/HR (ref 0–20)
GLUCOSE SERPL-MCNC: 182 MG/DL (ref 74–106)
HCT VFR BLD CALC: 32.2 % (ref 35.4–49)
HGB BLD-MCNC: 10.9 GM/DL (ref 11.7–16.9)
IRON SERPL-MCNC: 10 UG/DL (ref 50–175)
LACTATE SERPL-MCNC: 2.4 MMOL/L (ref 0.4–2)
MAGNESIUM SERPL-MCNC: 2.1 MG/DL (ref 1.8–2.4)
MAGNESIUM SERPL-MCNC: 2.3 MG/DL (ref 1.8–2.4)
MCH RBC QN AUTO: 25.4 PG (ref 25.7–33.7)
MCHC RBC AUTO-ENTMCNC: 33.7 G/DL (ref 32–35.9)
MCV RBC: 75.4 FL (ref 80–96)
NUC CELL # FLD: (no result) /MM3
PHOSPHATE SERPL-MCNC: 2 MG/DL (ref 2.5–4.9)
PHOSPHATE SERPL-MCNC: 2.2 MG/DL (ref 2.5–4.9)
PLATELET # BLD AUTO: 165 10^3/UL (ref 134–434)
PMV BLD: 8.3 FL (ref 7.5–11.1)
PROT SERPL-MCNC: 6.5 G/DL (ref 6.4–8.2)
RBC # BLD AUTO: 4.27 M/MM3 (ref 4–5.6)
SODIUM SERPL-SCNC: 141 MMOL/L (ref 136–145)
TIBC SERPL-MCNC: 180 UG/DL (ref 250–450)
WBC # BLD AUTO: 9.9 K/MM3 (ref 4–10)

## 2021-12-18 RX ADMIN — HEPARIN SODIUM SCH UNIT: 5000 INJECTION, SOLUTION INTRAVENOUS; SUBCUTANEOUS at 21:04

## 2021-12-18 RX ADMIN — POTASSIUM & SODIUM PHOSPHATES POWDER PACK 280-160-250 MG SCH PACKET: 280-160-250 PACK at 14:06

## 2021-12-18 RX ADMIN — COLCHICINE SCH MG: 0.6 CAPSULE ORAL at 14:04

## 2021-12-18 RX ADMIN — LABETALOL HCL SCH MG: 100 TABLET, FILM COATED ORAL at 11:11

## 2021-12-18 RX ADMIN — HEPARIN SODIUM SCH UNIT: 5000 INJECTION, SOLUTION INTRAVENOUS; SUBCUTANEOUS at 06:31

## 2021-12-18 RX ADMIN — NIFEDIPINE SCH MG: 90 TABLET, FILM COATED, EXTENDED RELEASE ORAL at 11:12

## 2021-12-18 RX ADMIN — PIPERACILLIN SODIUM AND TAZOBACTAM SODIUM SCH MLS/HR: .25; 2 INJECTION, POWDER, LYOPHILIZED, FOR SOLUTION INTRAVENOUS at 06:41

## 2021-12-18 RX ADMIN — TAMSULOSIN HYDROCHLORIDE SCH MG: 0.4 CAPSULE ORAL at 08:30

## 2021-12-18 RX ADMIN — PANTOPRAZOLE SODIUM SCH MG: 40 TABLET, DELAYED RELEASE ORAL at 11:12

## 2021-12-18 RX ADMIN — LABETALOL HCL SCH MG: 100 TABLET, FILM COATED ORAL at 21:04

## 2021-12-18 RX ADMIN — VANCOMYCIN HYDROCHLORIDE SCH MLS/HR: 1 INJECTION, POWDER, LYOPHILIZED, FOR SOLUTION INTRAVENOUS at 12:29

## 2021-12-18 RX ADMIN — PIPERACILLIN SODIUM AND TAZOBACTAM SODIUM SCH MLS/HR: .25; 2 INJECTION, POWDER, LYOPHILIZED, FOR SOLUTION INTRAVENOUS at 17:05

## 2021-12-18 RX ADMIN — POTASSIUM & SODIUM PHOSPHATES POWDER PACK 280-160-250 MG SCH PACKET: 280-160-250 PACK at 21:04

## 2021-12-18 RX ADMIN — PIPERACILLIN SODIUM AND TAZOBACTAM SODIUM SCH MLS/HR: .25; 2 INJECTION, POWDER, LYOPHILIZED, FOR SOLUTION INTRAVENOUS at 11:10

## 2021-12-18 RX ADMIN — HEPARIN SODIUM SCH UNIT: 5000 INJECTION, SOLUTION INTRAVENOUS; SUBCUTANEOUS at 14:06

## 2021-12-19 LAB
ANION GAP SERPL CALC-SCNC: 8 MMOL/L (ref 8–16)
BASOPHILS # BLD: 0.6 % (ref 0–2)
BUN SERPL-MCNC: 23.1 MG/DL (ref 7–18)
CALCIUM SERPL-MCNC: 8.3 MG/DL (ref 8.5–10.1)
CHLORIDE SERPL-SCNC: 101 MMOL/L (ref 98–107)
CO2 SERPL-SCNC: 28 MMOL/L (ref 21–32)
CREAT SERPL-MCNC: 2.3 MG/DL (ref 0.55–1.3)
DEPRECATED RDW RBC AUTO: 17.6 % (ref 11.9–15.9)
EOSINOPHIL # BLD: 1.8 % (ref 0–4.5)
GLUCOSE SERPL-MCNC: 138 MG/DL (ref 74–106)
HCT VFR BLD CALC: 31 % (ref 35.4–49)
HGB BLD-MCNC: 10.5 GM/DL (ref 11.7–16.9)
LYMPHOCYTES # BLD: 11.5 % (ref 8–40)
MAGNESIUM SERPL-MCNC: 2 MG/DL (ref 1.8–2.4)
MCH RBC QN AUTO: 25.7 PG (ref 25.7–33.7)
MCHC RBC AUTO-ENTMCNC: 33.8 G/DL (ref 32–35.9)
MCV RBC: 75.9 FL (ref 80–96)
MONOCYTES # BLD AUTO: 8.2 % (ref 3.8–10.2)
NEUTROPHILS # BLD: 77.9 % (ref 42.8–82.8)
PLATELET # BLD AUTO: 168 10^3/UL (ref 134–434)
PMV BLD: 8.8 FL (ref 7.5–11.1)
RBC # BLD AUTO: 4.08 M/MM3 (ref 4–5.6)
SODIUM SERPL-SCNC: 137 MMOL/L (ref 136–145)
WBC # BLD AUTO: 9.2 K/MM3 (ref 4–10)

## 2021-12-19 RX ADMIN — PANTOPRAZOLE SODIUM SCH MG: 40 TABLET, DELAYED RELEASE ORAL at 09:14

## 2021-12-19 RX ADMIN — PIPERACILLIN SODIUM AND TAZOBACTAM SODIUM SCH MLS/HR: .25; 2 INJECTION, POWDER, LYOPHILIZED, FOR SOLUTION INTRAVENOUS at 09:16

## 2021-12-19 RX ADMIN — VANCOMYCIN HYDROCHLORIDE SCH MLS/HR: 1 INJECTION, POWDER, LYOPHILIZED, FOR SOLUTION INTRAVENOUS at 12:22

## 2021-12-19 RX ADMIN — PIPERACILLIN SODIUM AND TAZOBACTAM SODIUM SCH MLS/HR: .25; 2 INJECTION, POWDER, LYOPHILIZED, FOR SOLUTION INTRAVENOUS at 01:30

## 2021-12-19 RX ADMIN — NIFEDIPINE SCH MG: 90 TABLET, FILM COATED, EXTENDED RELEASE ORAL at 09:14

## 2021-12-19 RX ADMIN — LABETALOL HCL SCH MG: 100 TABLET, FILM COATED ORAL at 09:13

## 2021-12-19 RX ADMIN — ALUMINUM HYDROXIDE, MAGNESIUM HYDROXIDE, AND SIMETHICONE SCH ML: 200; 200; 20 SUSPENSION ORAL at 12:35

## 2021-12-19 RX ADMIN — COLCHICINE SCH MG: 0.6 CAPSULE ORAL at 09:14

## 2021-12-19 RX ADMIN — TAMSULOSIN HYDROCHLORIDE SCH MG: 0.4 CAPSULE ORAL at 09:12

## 2021-12-19 RX ADMIN — POTASSIUM & SODIUM PHOSPHATES POWDER PACK 280-160-250 MG SCH PACKET: 280-160-250 PACK at 09:14

## 2021-12-19 RX ADMIN — PIPERACILLIN SODIUM AND TAZOBACTAM SODIUM SCH MLS/HR: .25; 2 INJECTION, POWDER, LYOPHILIZED, FOR SOLUTION INTRAVENOUS at 17:12

## 2021-12-19 RX ADMIN — HEPARIN SODIUM SCH UNIT: 5000 INJECTION, SOLUTION INTRAVENOUS; SUBCUTANEOUS at 06:11

## 2021-12-19 RX ADMIN — ALUMINUM HYDROXIDE, MAGNESIUM HYDROXIDE, AND SIMETHICONE SCH ML: 200; 200; 20 SUSPENSION ORAL at 17:12

## 2021-12-19 RX ADMIN — LABETALOL HCL SCH MG: 100 TABLET, FILM COATED ORAL at 21:42

## 2021-12-19 RX ADMIN — POTASSIUM & SODIUM PHOSPHATES POWDER PACK 280-160-250 MG SCH PACKET: 280-160-250 PACK at 21:42

## 2021-12-20 LAB
ANION GAP SERPL CALC-SCNC: 9 MMOL/L (ref 8–16)
ANISOCYTOSIS BLD QL: (no result)
BASOPHILS # BLD: 0 % (ref 0–2)
BUN SERPL-MCNC: 26.6 MG/DL (ref 7–18)
CALCIUM SERPL-MCNC: 8.8 MG/DL (ref 8.5–10.1)
CHLORIDE SERPL-SCNC: 101 MMOL/L (ref 98–107)
CO2 SERPL-SCNC: 28 MMOL/L (ref 21–32)
CREAT SERPL-MCNC: 2 MG/DL (ref 0.55–1.3)
DEPRECATED RDW RBC AUTO: 17.9 % (ref 11.9–15.9)
EOSINOPHIL # BLD: 0 % (ref 0–4.5)
GLUCOSE SERPL-MCNC: 242 MG/DL (ref 74–106)
HCT VFR BLD CALC: 32.5 % (ref 35.4–49)
HGB BLD-MCNC: 10.6 GM/DL (ref 11.7–16.9)
LYMPHOCYTES # BLD: 2.7 % (ref 8–40)
MACROCYTES BLD QL: 0
MAGNESIUM SERPL-MCNC: 2.4 MG/DL (ref 1.8–2.4)
MCH RBC QN AUTO: 24.9 PG (ref 25.7–33.7)
MCHC RBC AUTO-ENTMCNC: 32.6 G/DL (ref 32–35.9)
MCV RBC: 76.3 FL (ref 80–96)
MONOCYTES # BLD AUTO: 2.5 % (ref 3.8–10.2)
MONOCYTES NFR PLR MANUAL: 1 %
NEUTROPHILS # BLD: 94.8 % (ref 42.8–82.8)
NUC CELL # FLD: (no result) /MM3
PLATELET # BLD AUTO: 214 10^3/UL (ref 134–434)
PLATELET BLD QL SMEAR: NORMAL
PMV BLD: 9 FL (ref 7.5–11.1)
RBC # BLD AUTO: 4.26 M/MM3 (ref 4–5.6)
SODIUM SERPL-SCNC: 138 MMOL/L (ref 136–145)
WBC # BLD AUTO: 8.7 K/MM3 (ref 4–10)

## 2021-12-20 PROCEDURE — 0S9C4ZZ DRAINAGE OF RIGHT KNEE JOINT, PERCUTANEOUS ENDOSCOPIC APPROACH: ICD-10-PCS | Performed by: ORTHOPAEDIC SURGERY

## 2021-12-20 RX ADMIN — PIPERACILLIN SODIUM AND TAZOBACTAM SODIUM SCH MLS/HR: .25; 2 INJECTION, POWDER, LYOPHILIZED, FOR SOLUTION INTRAVENOUS at 11:29

## 2021-12-20 RX ADMIN — ALUMINUM HYDROXIDE, MAGNESIUM HYDROXIDE, AND SIMETHICONE SCH: 200; 200; 20 SUSPENSION ORAL at 06:23

## 2021-12-20 RX ADMIN — PIPERACILLIN SODIUM AND TAZOBACTAM SODIUM SCH MLS/HR: .25; 2 INJECTION, POWDER, LYOPHILIZED, FOR SOLUTION INTRAVENOUS at 18:23

## 2021-12-20 RX ADMIN — PANTOPRAZOLE SODIUM SCH MG: 40 TABLET, DELAYED RELEASE ORAL at 11:27

## 2021-12-20 RX ADMIN — LABETALOL HCL SCH MG: 100 TABLET, FILM COATED ORAL at 21:24

## 2021-12-20 RX ADMIN — TAMSULOSIN HYDROCHLORIDE SCH: 0.4 CAPSULE ORAL at 08:30

## 2021-12-20 RX ADMIN — PIPERACILLIN SODIUM AND TAZOBACTAM SODIUM SCH MLS/HR: .25; 2 INJECTION, POWDER, LYOPHILIZED, FOR SOLUTION INTRAVENOUS at 02:04

## 2021-12-20 RX ADMIN — NIFEDIPINE SCH MG: 90 TABLET, FILM COATED, EXTENDED RELEASE ORAL at 11:27

## 2021-12-20 RX ADMIN — POTASSIUM & SODIUM PHOSPHATES POWDER PACK 280-160-250 MG SCH PACKET: 280-160-250 PACK at 11:28

## 2021-12-20 RX ADMIN — TAMSULOSIN HYDROCHLORIDE SCH MG: 0.4 CAPSULE ORAL at 12:59

## 2021-12-20 RX ADMIN — COLCHICINE SCH MG: 0.6 CAPSULE ORAL at 11:28

## 2021-12-20 RX ADMIN — ALUMINUM HYDROXIDE, MAGNESIUM HYDROXIDE, AND SIMETHICONE SCH ML: 200; 200; 20 SUSPENSION ORAL at 11:30

## 2021-12-20 RX ADMIN — ALUMINUM HYDROXIDE, MAGNESIUM HYDROXIDE, AND SIMETHICONE SCH ML: 200; 200; 20 SUSPENSION ORAL at 18:23

## 2021-12-20 RX ADMIN — ALUMINUM HYDROXIDE, MAGNESIUM HYDROXIDE, AND SIMETHICONE SCH ML: 200; 200; 20 SUSPENSION ORAL at 00:09

## 2021-12-20 RX ADMIN — POTASSIUM & SODIUM PHOSPHATES POWDER PACK 280-160-250 MG SCH PACKET: 280-160-250 PACK at 21:24

## 2021-12-20 RX ADMIN — LABETALOL HCL SCH MG: 100 TABLET, FILM COATED ORAL at 11:27

## 2021-12-20 RX ADMIN — VANCOMYCIN HYDROCHLORIDE SCH MLS/HR: 1 INJECTION, POWDER, LYOPHILIZED, FOR SOLUTION INTRAVENOUS at 13:50

## 2021-12-21 LAB
ALBUMIN SERPL-MCNC: 2.1 G/DL (ref 3.4–5)
ALP SERPL-CCNC: 107 U/L (ref 45–117)
ALT SERPL-CCNC: 19 U/L (ref 13–61)
ANION GAP SERPL CALC-SCNC: 11 MMOL/L (ref 8–16)
AST SERPL-CCNC: 20 U/L (ref 15–37)
BILIRUB SERPL-MCNC: 0.7 MG/DL (ref 0.2–1)
BUN SERPL-MCNC: 24.8 MG/DL (ref 7–18)
CALCIUM SERPL-MCNC: 9.1 MG/DL (ref 8.5–10.1)
CHLORIDE SERPL-SCNC: 101 MMOL/L (ref 98–107)
CO2 SERPL-SCNC: 27 MMOL/L (ref 21–32)
CREAT SERPL-MCNC: 2 MG/DL (ref 0.55–1.3)
DEPRECATED RDW RBC AUTO: 17.5 % (ref 11.9–15.9)
ERYTHROCYTE [SEDIMENTATION RATE] IN BLOOD BY WESTERGREN METHOD: 96 MM/HR (ref 0–20)
GLUCOSE SERPL-MCNC: 114 MG/DL (ref 74–106)
HCT VFR BLD CALC: 31.2 % (ref 35.4–49)
HGB BLD-MCNC: 10.2 GM/DL (ref 11.7–16.9)
MCH RBC QN AUTO: 25.1 PG (ref 25.7–33.7)
MCHC RBC AUTO-ENTMCNC: 32.8 G/DL (ref 32–35.9)
MCV RBC: 76.6 FL (ref 80–96)
PLATELET # BLD AUTO: 230 10^3/UL (ref 134–434)
PMV BLD: 8.7 FL (ref 7.5–11.1)
PROT SERPL-MCNC: 6.4 G/DL (ref 6.4–8.2)
RBC # BLD AUTO: 4.07 M/MM3 (ref 4–5.6)
SODIUM SERPL-SCNC: 139 MMOL/L (ref 136–145)
WBC # BLD AUTO: 11.7 K/MM3 (ref 4–10)

## 2021-12-21 RX ADMIN — ALUMINUM HYDROXIDE, MAGNESIUM HYDROXIDE, AND SIMETHICONE SCH ML: 200; 200; 20 SUSPENSION ORAL at 11:49

## 2021-12-21 RX ADMIN — PANTOPRAZOLE SODIUM SCH MG: 40 TABLET, DELAYED RELEASE ORAL at 09:42

## 2021-12-21 RX ADMIN — ALUMINUM HYDROXIDE, MAGNESIUM HYDROXIDE, AND SIMETHICONE SCH ML: 200; 200; 20 SUSPENSION ORAL at 17:48

## 2021-12-21 RX ADMIN — TAMSULOSIN HYDROCHLORIDE SCH MG: 0.4 CAPSULE ORAL at 09:42

## 2021-12-21 RX ADMIN — COLCHICINE SCH MG: 0.6 CAPSULE ORAL at 09:47

## 2021-12-21 RX ADMIN — PIPERACILLIN SODIUM AND TAZOBACTAM SODIUM SCH MLS/HR: .25; 2 INJECTION, POWDER, LYOPHILIZED, FOR SOLUTION INTRAVENOUS at 01:27

## 2021-12-21 RX ADMIN — LABETALOL HCL SCH MG: 100 TABLET, FILM COATED ORAL at 21:19

## 2021-12-21 RX ADMIN — ALUMINUM HYDROXIDE, MAGNESIUM HYDROXIDE, AND SIMETHICONE SCH ML: 200; 200; 20 SUSPENSION ORAL at 01:10

## 2021-12-21 RX ADMIN — POTASSIUM & SODIUM PHOSPHATES POWDER PACK 280-160-250 MG SCH PACKET: 280-160-250 PACK at 09:43

## 2021-12-21 RX ADMIN — LABETALOL HCL SCH MG: 100 TABLET, FILM COATED ORAL at 09:42

## 2021-12-21 RX ADMIN — VANCOMYCIN HYDROCHLORIDE SCH MLS/HR: 1 INJECTION, POWDER, LYOPHILIZED, FOR SOLUTION INTRAVENOUS at 12:20

## 2021-12-21 RX ADMIN — NIFEDIPINE SCH MG: 90 TABLET, FILM COATED, EXTENDED RELEASE ORAL at 09:42

## 2021-12-21 RX ADMIN — PIPERACILLIN SODIUM AND TAZOBACTAM SODIUM SCH MLS/HR: .25; 2 INJECTION, POWDER, LYOPHILIZED, FOR SOLUTION INTRAVENOUS at 17:48

## 2021-12-21 RX ADMIN — POTASSIUM & SODIUM PHOSPHATES POWDER PACK 280-160-250 MG SCH PACKET: 280-160-250 PACK at 21:18

## 2021-12-21 RX ADMIN — ALUMINUM HYDROXIDE, MAGNESIUM HYDROXIDE, AND SIMETHICONE SCH ML: 200; 200; 20 SUSPENSION ORAL at 06:18

## 2021-12-21 RX ADMIN — PIPERACILLIN SODIUM AND TAZOBACTAM SODIUM SCH MLS/HR: .25; 2 INJECTION, POWDER, LYOPHILIZED, FOR SOLUTION INTRAVENOUS at 09:48

## 2021-12-22 VITALS — SYSTOLIC BLOOD PRESSURE: 153 MMHG | HEART RATE: 82 BPM | DIASTOLIC BLOOD PRESSURE: 87 MMHG | TEMPERATURE: 99.1 F

## 2021-12-22 LAB
ANION GAP SERPL CALC-SCNC: 9 MMOL/L (ref 8–16)
BUN SERPL-MCNC: 23.6 MG/DL (ref 7–18)
CALCIUM SERPL-MCNC: 9.3 MG/DL (ref 8.5–10.1)
CHLORIDE SERPL-SCNC: 100 MMOL/L (ref 98–107)
CO2 SERPL-SCNC: 29 MMOL/L (ref 21–32)
CREAT SERPL-MCNC: 2 MG/DL (ref 0.55–1.3)
DEPRECATED RDW RBC AUTO: 18.1 % (ref 11.9–15.9)
GLUCOSE SERPL-MCNC: 105 MG/DL (ref 74–106)
HCT VFR BLD CALC: 31.4 % (ref 35.4–49)
HGB BLD-MCNC: 10.1 GM/DL (ref 11.7–16.9)
MAGNESIUM SERPL-MCNC: 2.4 MG/DL (ref 1.8–2.4)
MCH RBC QN AUTO: 24.8 PG (ref 25.7–33.7)
MCHC RBC AUTO-ENTMCNC: 32.2 G/DL (ref 32–35.9)
MCV RBC: 77 FL (ref 80–96)
PLATELET # BLD AUTO: 271 10^3/UL (ref 134–434)
PMV BLD: 8.8 FL (ref 7.5–11.1)
RBC # BLD AUTO: 4.08 M/MM3 (ref 4–5.6)
SODIUM SERPL-SCNC: 138 MMOL/L (ref 136–145)
WBC # BLD AUTO: 11.7 K/MM3 (ref 4–10)

## 2021-12-22 RX ADMIN — VANCOMYCIN HYDROCHLORIDE SCH MLS/HR: 1 INJECTION, POWDER, LYOPHILIZED, FOR SOLUTION INTRAVENOUS at 11:52

## 2021-12-22 RX ADMIN — NIFEDIPINE SCH MG: 90 TABLET, FILM COATED, EXTENDED RELEASE ORAL at 09:50

## 2021-12-22 RX ADMIN — ALUMINUM HYDROXIDE, MAGNESIUM HYDROXIDE, AND SIMETHICONE SCH ML: 200; 200; 20 SUSPENSION ORAL at 00:00

## 2021-12-22 RX ADMIN — POTASSIUM & SODIUM PHOSPHATES POWDER PACK 280-160-250 MG SCH PACKET: 280-160-250 PACK at 09:48

## 2021-12-22 RX ADMIN — COLCHICINE SCH MG: 0.6 CAPSULE ORAL at 09:49

## 2021-12-22 RX ADMIN — ALUMINUM HYDROXIDE, MAGNESIUM HYDROXIDE, AND SIMETHICONE SCH ML: 200; 200; 20 SUSPENSION ORAL at 05:45

## 2021-12-22 RX ADMIN — TAMSULOSIN HYDROCHLORIDE SCH MG: 0.4 CAPSULE ORAL at 08:16

## 2021-12-22 RX ADMIN — LABETALOL HCL SCH MG: 100 TABLET, FILM COATED ORAL at 09:50

## 2021-12-22 RX ADMIN — PIPERACILLIN SODIUM AND TAZOBACTAM SODIUM SCH MLS/HR: .25; 2 INJECTION, POWDER, LYOPHILIZED, FOR SOLUTION INTRAVENOUS at 01:03

## 2021-12-22 RX ADMIN — PIPERACILLIN SODIUM AND TAZOBACTAM SODIUM SCH MLS/HR: .25; 2 INJECTION, POWDER, LYOPHILIZED, FOR SOLUTION INTRAVENOUS at 09:45

## 2021-12-22 RX ADMIN — ALUMINUM HYDROXIDE, MAGNESIUM HYDROXIDE, AND SIMETHICONE SCH ML: 200; 200; 20 SUSPENSION ORAL at 11:59

## 2021-12-22 RX ADMIN — ALUMINUM HYDROXIDE, MAGNESIUM HYDROXIDE, AND SIMETHICONE SCH ML: 200; 200; 20 SUSPENSION ORAL at 17:39

## 2021-12-22 RX ADMIN — PANTOPRAZOLE SODIUM SCH MG: 40 TABLET, DELAYED RELEASE ORAL at 09:48

## 2021-12-23 ENCOUNTER — HOSPITAL ENCOUNTER (INPATIENT)
Dept: HOSPITAL 74 - JER | Age: 66
LOS: 6 days | Discharge: NURSING, REHAB OR LONG-TERM CARE | DRG: 351 | End: 2021-12-29
Attending: INTERNAL MEDICINE | Admitting: STUDENT IN AN ORGANIZED HEALTH CARE EDUCATION/TRAINING PROGRAM
Payer: COMMERCIAL

## 2021-12-23 VITALS — BODY MASS INDEX: 25.2 KG/M2

## 2021-12-23 DIAGNOSIS — M25.561: ICD-10-CM

## 2021-12-23 DIAGNOSIS — M10.9: Primary | ICD-10-CM

## 2021-12-23 DIAGNOSIS — K21.9: ICD-10-CM

## 2021-12-23 DIAGNOSIS — I12.9: ICD-10-CM

## 2021-12-23 DIAGNOSIS — N40.0: ICD-10-CM

## 2021-12-23 DIAGNOSIS — E78.5: ICD-10-CM

## 2021-12-23 DIAGNOSIS — N18.4: ICD-10-CM

## 2021-12-23 DIAGNOSIS — M25.571: ICD-10-CM

## 2021-12-23 DIAGNOSIS — I69.351: ICD-10-CM

## 2021-12-23 DIAGNOSIS — N17.9: ICD-10-CM

## 2021-12-23 DIAGNOSIS — E87.6: ICD-10-CM

## 2021-12-23 LAB
ALBUMIN SERPL-MCNC: 2.5 G/DL (ref 3.4–5)
ALP SERPL-CCNC: 125 U/L (ref 45–117)
ALT SERPL-CCNC: 30 U/L (ref 13–61)
ANION GAP SERPL CALC-SCNC: 9 MMOL/L (ref 8–16)
ANISOCYTOSIS BLD QL: (no result)
AST SERPL-CCNC: 40 U/L (ref 15–37)
BASOPHILS # BLD: 0.5 % (ref 0–2)
BILIRUB SERPL-MCNC: 0.6 MG/DL (ref 0.2–1)
BUN SERPL-MCNC: 33.5 MG/DL (ref 7–18)
CALCIUM SERPL-MCNC: 9.7 MG/DL (ref 8.5–10.1)
CHLORIDE SERPL-SCNC: 100 MMOL/L (ref 98–107)
CO2 SERPL-SCNC: 28 MMOL/L (ref 21–32)
CREAT SERPL-MCNC: 2.3 MG/DL (ref 0.55–1.3)
DEPRECATED RDW RBC AUTO: 17.6 % (ref 11.9–15.9)
EOSINOPHIL # BLD: 0.1 % (ref 0–4.5)
GLUCOSE SERPL-MCNC: 160 MG/DL (ref 74–106)
HCT VFR BLD CALC: 34 % (ref 35.4–49)
HGB BLD-MCNC: 10.9 GM/DL (ref 11.7–16.9)
LYMPHOCYTES # BLD: 5.6 % (ref 8–40)
MACROCYTES BLD QL: 0
MCH RBC QN AUTO: 24.5 PG (ref 25.7–33.7)
MCHC RBC AUTO-ENTMCNC: 32.1 G/DL (ref 32–35.9)
MCV RBC: 76.4 FL (ref 80–96)
MONOCYTES # BLD AUTO: 1.7 % (ref 3.8–10.2)
NEUTROPHILS # BLD: 92.1 % (ref 42.8–82.8)
OVALOCYTES BLD QL SMEAR: (no result)
PLATELET # BLD AUTO: 348 10^3/UL (ref 134–434)
PLATELET BLD QL SMEAR: NORMAL
PMV BLD: 8.3 FL (ref 7.5–11.1)
PROT SERPL-MCNC: 7.6 G/DL (ref 6.4–8.2)
RBC # BLD AUTO: 4.44 M/MM3 (ref 4–5.6)
SODIUM SERPL-SCNC: 137 MMOL/L (ref 136–145)
URATE SERPL-SCNC: 6.9 MG/DL (ref 2.6–7.2)
WBC # BLD AUTO: 11.6 K/MM3 (ref 4–10)

## 2021-12-23 PROCEDURE — U0005 INFEC AGEN DETEC AMPLI PROBE: HCPCS

## 2021-12-23 PROCEDURE — C9803 HOPD COVID-19 SPEC COLLECT: HCPCS

## 2021-12-23 PROCEDURE — U0003 INFECTIOUS AGENT DETECTION BY NUCLEIC ACID (DNA OR RNA); SEVERE ACUTE RESPIRATORY SYNDROME CORONAVIRUS 2 (SARS-COV-2) (CORONAVIRUS DISEASE [COVID-19]), AMPLIFIED PROBE TECHNIQUE, MAKING USE OF HIGH THROUGHPUT TECHNOLOGIES AS DESCRIBED BY CMS-2020-01-R: HCPCS

## 2021-12-23 RX ADMIN — COLCHICINE SCH MG: 0.6 CAPSULE ORAL at 22:56

## 2021-12-23 RX ADMIN — LOSARTAN POTASSIUM SCH MG: 50 TABLET, FILM COATED ORAL at 22:36

## 2021-12-23 RX ADMIN — LABETALOL HYDROCHLORIDE SCH MG: 200 TABLET, FILM COATED ORAL at 22:36

## 2021-12-23 RX ADMIN — HEPARIN SODIUM SCH UNIT: 5000 INJECTION, SOLUTION INTRAVENOUS; SUBCUTANEOUS at 22:56

## 2021-12-23 RX ADMIN — ASPIRIN SCH MG: 81 TABLET, COATED ORAL at 22:36

## 2021-12-24 RX ADMIN — LABETALOL HYDROCHLORIDE SCH MG: 200 TABLET, FILM COATED ORAL at 06:50

## 2021-12-24 RX ADMIN — PANTOPRAZOLE SODIUM SCH MG: 40 TABLET, DELAYED RELEASE ORAL at 09:43

## 2021-12-24 RX ADMIN — HEPARIN SODIUM SCH UNIT: 5000 INJECTION, SOLUTION INTRAVENOUS; SUBCUTANEOUS at 21:11

## 2021-12-24 RX ADMIN — TAMSULOSIN HYDROCHLORIDE SCH MG: 0.4 CAPSULE ORAL at 08:12

## 2021-12-24 RX ADMIN — HEPARIN SODIUM SCH UNIT: 5000 INJECTION, SOLUTION INTRAVENOUS; SUBCUTANEOUS at 13:37

## 2021-12-24 RX ADMIN — COLCHICINE SCH MG: 0.6 CAPSULE ORAL at 09:43

## 2021-12-24 RX ADMIN — PREDNISONE SCH MG: 20 TABLET ORAL at 09:43

## 2021-12-24 RX ADMIN — LABETALOL HYDROCHLORIDE SCH MG: 200 TABLET, FILM COATED ORAL at 21:11

## 2021-12-24 RX ADMIN — HEPARIN SODIUM SCH UNIT: 5000 INJECTION, SOLUTION INTRAVENOUS; SUBCUTANEOUS at 06:18

## 2021-12-24 RX ADMIN — LOSARTAN POTASSIUM SCH MG: 50 TABLET, FILM COATED ORAL at 09:43

## 2021-12-24 RX ADMIN — LABETALOL HYDROCHLORIDE SCH: 200 TABLET, FILM COATED ORAL at 09:53

## 2021-12-24 RX ADMIN — ASPIRIN SCH MG: 81 TABLET, COATED ORAL at 09:43

## 2021-12-24 RX ADMIN — LABETALOL HYDROCHLORIDE SCH MG: 200 TABLET, FILM COATED ORAL at 09:46

## 2021-12-25 LAB
ALBUMIN SERPL-MCNC: 2.3 G/DL (ref 3.4–5)
ALP SERPL-CCNC: 96 U/L (ref 45–117)
ALT SERPL-CCNC: 24 U/L (ref 13–61)
ANION GAP SERPL CALC-SCNC: 9 MMOL/L (ref 8–16)
AST SERPL-CCNC: 18 U/L (ref 15–37)
BASOPHILS # BLD: 0.5 % (ref 0–2)
BILIRUB SERPL-MCNC: 0.5 MG/DL (ref 0.2–1)
BUN SERPL-MCNC: 33.7 MG/DL (ref 7–18)
CALCIUM SERPL-MCNC: 9.2 MG/DL (ref 8.5–10.1)
CHLORIDE SERPL-SCNC: 103 MMOL/L (ref 98–107)
CO2 SERPL-SCNC: 28 MMOL/L (ref 21–32)
CREAT SERPL-MCNC: 2.2 MG/DL (ref 0.55–1.3)
DEPRECATED RDW RBC AUTO: 17.3 % (ref 11.9–15.9)
EOSINOPHIL # BLD: 0.6 % (ref 0–4.5)
GLUCOSE SERPL-MCNC: 98 MG/DL (ref 74–106)
HCT VFR BLD CALC: 31.8 % (ref 35.4–49)
HGB BLD-MCNC: 10.4 GM/DL (ref 11.7–16.9)
LYMPHOCYTES # BLD: 18.4 % (ref 8–40)
MAGNESIUM SERPL-MCNC: 2.5 MG/DL (ref 1.8–2.4)
MCH RBC QN AUTO: 24.7 PG (ref 25.7–33.7)
MCHC RBC AUTO-ENTMCNC: 32.6 G/DL (ref 32–35.9)
MCV RBC: 75.7 FL (ref 80–96)
MONOCYTES # BLD AUTO: 8.5 % (ref 3.8–10.2)
NEUTROPHILS # BLD: 72 % (ref 42.8–82.8)
PHOSPHATE SERPL-MCNC: 3.7 MG/DL (ref 2.5–4.9)
PLATELET # BLD AUTO: 419 10^3/UL (ref 134–434)
PMV BLD: 8 FL (ref 7.5–11.1)
PROT SERPL-MCNC: 6.5 G/DL (ref 6.4–8.2)
RBC # BLD AUTO: 4.2 M/MM3 (ref 4–5.6)
SODIUM SERPL-SCNC: 140 MMOL/L (ref 136–145)
WBC # BLD AUTO: 10.9 K/MM3 (ref 4–10)

## 2021-12-25 RX ADMIN — HEPARIN SODIUM SCH UNIT: 5000 INJECTION, SOLUTION INTRAVENOUS; SUBCUTANEOUS at 13:24

## 2021-12-25 RX ADMIN — NIFEDIPINE SCH: 60 TABLET, EXTENDED RELEASE ORAL at 09:20

## 2021-12-25 RX ADMIN — LABETALOL HYDROCHLORIDE SCH MG: 200 TABLET, FILM COATED ORAL at 09:19

## 2021-12-25 RX ADMIN — PREDNISONE SCH MG: 20 TABLET ORAL at 09:19

## 2021-12-25 RX ADMIN — COLCHICINE SCH MG: 0.6 CAPSULE ORAL at 09:19

## 2021-12-25 RX ADMIN — NIFEDIPINE SCH MG: 60 TABLET, EXTENDED RELEASE ORAL at 21:27

## 2021-12-25 RX ADMIN — POTASSIUM CHLORIDE SCH MLS/HR: 7.46 INJECTION, SOLUTION INTRAVENOUS at 12:47

## 2021-12-25 RX ADMIN — ASPIRIN SCH MG: 81 TABLET, COATED ORAL at 09:19

## 2021-12-25 RX ADMIN — HEPARIN SODIUM SCH UNIT: 5000 INJECTION, SOLUTION INTRAVENOUS; SUBCUTANEOUS at 06:20

## 2021-12-25 RX ADMIN — LABETALOL HYDROCHLORIDE SCH MG: 200 TABLET, FILM COATED ORAL at 21:26

## 2021-12-25 RX ADMIN — TAMSULOSIN HYDROCHLORIDE SCH MG: 0.4 CAPSULE ORAL at 09:19

## 2021-12-25 RX ADMIN — POTASSIUM CHLORIDE SCH MLS/HR: 7.46 INJECTION, SOLUTION INTRAVENOUS at 10:32

## 2021-12-25 RX ADMIN — PANTOPRAZOLE SODIUM SCH MG: 40 TABLET, DELAYED RELEASE ORAL at 09:19

## 2021-12-25 RX ADMIN — LOSARTAN POTASSIUM SCH MG: 50 TABLET, FILM COATED ORAL at 09:19

## 2021-12-25 RX ADMIN — NIFEDIPINE SCH MG: 60 TABLET, EXTENDED RELEASE ORAL at 06:20

## 2021-12-25 RX ADMIN — POTASSIUM CHLORIDE SCH MLS/HR: 7.46 INJECTION, SOLUTION INTRAVENOUS at 11:53

## 2021-12-25 RX ADMIN — HEPARIN SODIUM SCH UNIT: 5000 INJECTION, SOLUTION INTRAVENOUS; SUBCUTANEOUS at 21:26

## 2021-12-26 LAB
ALBUMIN SERPL-MCNC: 2.3 G/DL (ref 3.4–5)
ALP SERPL-CCNC: 96 U/L (ref 45–117)
ALT SERPL-CCNC: 24 U/L (ref 13–61)
ANION GAP SERPL CALC-SCNC: 10 MMOL/L (ref 8–16)
AST SERPL-CCNC: 15 U/L (ref 15–37)
BASOPHILS # BLD: 0.4 % (ref 0–2)
BILIRUB SERPL-MCNC: 0.6 MG/DL (ref 0.2–1)
BUN SERPL-MCNC: 29 MG/DL (ref 7–18)
CALCIUM SERPL-MCNC: 9.2 MG/DL (ref 8.5–10.1)
CHLORIDE SERPL-SCNC: 102 MMOL/L (ref 98–107)
CO2 SERPL-SCNC: 25 MMOL/L (ref 21–32)
CREAT SERPL-MCNC: 2 MG/DL (ref 0.55–1.3)
DEPRECATED RDW RBC AUTO: 17.2 % (ref 11.9–15.9)
EOSINOPHIL # BLD: 0.3 % (ref 0–4.5)
GLUCOSE SERPL-MCNC: 153 MG/DL (ref 74–106)
HCT VFR BLD CALC: 31.8 % (ref 35.4–49)
HGB BLD-MCNC: 10.3 GM/DL (ref 11.7–16.9)
LYMPHOCYTES # BLD: 12.4 % (ref 8–40)
MAGNESIUM SERPL-MCNC: 2.2 MG/DL (ref 1.8–2.4)
MCH RBC QN AUTO: 24.7 PG (ref 25.7–33.7)
MCHC RBC AUTO-ENTMCNC: 32.5 G/DL (ref 32–35.9)
MCV RBC: 75.8 FL (ref 80–96)
MONOCYTES # BLD AUTO: 7.1 % (ref 3.8–10.2)
NEUTROPHILS # BLD: 79.8 % (ref 42.8–82.8)
PLATELET # BLD AUTO: 449 10^3/UL (ref 134–434)
PMV BLD: 8.1 FL (ref 7.5–11.1)
PROT SERPL-MCNC: 6.6 G/DL (ref 6.4–8.2)
RBC # BLD AUTO: 4.19 M/MM3 (ref 4–5.6)
SODIUM SERPL-SCNC: 138 MMOL/L (ref 136–145)
WBC # BLD AUTO: 14.5 K/MM3 (ref 4–10)

## 2021-12-26 RX ADMIN — LABETALOL HYDROCHLORIDE SCH MG: 200 TABLET, FILM COATED ORAL at 10:05

## 2021-12-26 RX ADMIN — PREDNISONE SCH MG: 20 TABLET ORAL at 10:05

## 2021-12-26 RX ADMIN — LABETALOL HYDROCHLORIDE SCH MG: 200 TABLET, FILM COATED ORAL at 22:04

## 2021-12-26 RX ADMIN — HEPARIN SODIUM SCH UNIT: 5000 INJECTION, SOLUTION INTRAVENOUS; SUBCUTANEOUS at 15:09

## 2021-12-26 RX ADMIN — NIFEDIPINE SCH MG: 60 TABLET, EXTENDED RELEASE ORAL at 10:06

## 2021-12-26 RX ADMIN — NIFEDIPINE SCH MG: 60 TABLET, EXTENDED RELEASE ORAL at 22:05

## 2021-12-26 RX ADMIN — COLCHICINE SCH MG: 0.6 CAPSULE ORAL at 10:54

## 2021-12-26 RX ADMIN — HEPARIN SODIUM SCH UNIT: 5000 INJECTION, SOLUTION INTRAVENOUS; SUBCUTANEOUS at 22:05

## 2021-12-26 RX ADMIN — TAMSULOSIN HYDROCHLORIDE SCH MG: 0.4 CAPSULE ORAL at 10:05

## 2021-12-26 RX ADMIN — PANTOPRAZOLE SODIUM SCH MG: 40 TABLET, DELAYED RELEASE ORAL at 10:05

## 2021-12-26 RX ADMIN — HEPARIN SODIUM SCH UNIT: 5000 INJECTION, SOLUTION INTRAVENOUS; SUBCUTANEOUS at 06:30

## 2021-12-26 RX ADMIN — LOSARTAN POTASSIUM SCH MG: 50 TABLET, FILM COATED ORAL at 10:05

## 2021-12-26 RX ADMIN — ASPIRIN SCH MG: 81 TABLET, COATED ORAL at 10:05

## 2021-12-27 LAB
ANION GAP SERPL CALC-SCNC: 11 MMOL/L (ref 8–16)
ANISOCYTOSIS BLD QL: (no result)
BUN SERPL-MCNC: 26.2 MG/DL (ref 7–18)
CALCIUM SERPL-MCNC: 9.2 MG/DL (ref 8.5–10.1)
CHLORIDE SERPL-SCNC: 100 MMOL/L (ref 98–107)
CO2 SERPL-SCNC: 26 MMOL/L (ref 21–32)
CREAT SERPL-MCNC: 2.1 MG/DL (ref 0.55–1.3)
DEPRECATED RDW RBC AUTO: 17.5 % (ref 11.9–15.9)
GLUCOSE SERPL-MCNC: 165 MG/DL (ref 74–106)
HCT VFR BLD CALC: 33.4 % (ref 35.4–49)
HGB BLD-MCNC: 10.6 GM/DL (ref 11.7–16.9)
MACROCYTES BLD QL: 0
MCH RBC QN AUTO: 24.1 PG (ref 25.7–33.7)
MCHC RBC AUTO-ENTMCNC: 31.7 G/DL (ref 32–35.9)
MCV RBC: 76 FL (ref 80–96)
PLATELET # BLD AUTO: 496 10^3/UL (ref 134–434)
PLATELET BLD QL SMEAR: (no result)
PMV BLD: 8 FL (ref 7.5–11.1)
RBC # BLD AUTO: 4.4 M/MM3 (ref 4–5.6)
SODIUM SERPL-SCNC: 137 MMOL/L (ref 136–145)
WBC # BLD AUTO: 12.9 K/MM3 (ref 4–10)

## 2021-12-27 RX ADMIN — PREDNISONE SCH MG: 20 TABLET ORAL at 10:10

## 2021-12-27 RX ADMIN — NIFEDIPINE SCH MG: 60 TABLET, EXTENDED RELEASE ORAL at 10:16

## 2021-12-27 RX ADMIN — HEPARIN SODIUM SCH UNIT: 5000 INJECTION, SOLUTION INTRAVENOUS; SUBCUTANEOUS at 15:15

## 2021-12-27 RX ADMIN — HEPARIN SODIUM SCH UNIT: 5000 INJECTION, SOLUTION INTRAVENOUS; SUBCUTANEOUS at 06:07

## 2021-12-27 RX ADMIN — LOSARTAN POTASSIUM SCH MG: 50 TABLET, FILM COATED ORAL at 10:10

## 2021-12-27 RX ADMIN — PANTOPRAZOLE SODIUM SCH MG: 40 TABLET, DELAYED RELEASE ORAL at 10:10

## 2021-12-27 RX ADMIN — ASPIRIN SCH MG: 81 TABLET, COATED ORAL at 10:10

## 2021-12-27 RX ADMIN — TAMSULOSIN HYDROCHLORIDE SCH MG: 0.4 CAPSULE ORAL at 08:10

## 2021-12-27 RX ADMIN — COLCHICINE SCH MG: 0.6 CAPSULE ORAL at 10:12

## 2021-12-27 RX ADMIN — HEPARIN SODIUM SCH UNIT: 5000 INJECTION, SOLUTION INTRAVENOUS; SUBCUTANEOUS at 21:38

## 2021-12-27 RX ADMIN — NIFEDIPINE SCH MG: 60 TABLET, EXTENDED RELEASE ORAL at 21:39

## 2021-12-27 RX ADMIN — LABETALOL HYDROCHLORIDE SCH MG: 200 TABLET, FILM COATED ORAL at 10:16

## 2021-12-27 RX ADMIN — LABETALOL HYDROCHLORIDE SCH MG: 200 TABLET, FILM COATED ORAL at 21:38

## 2021-12-28 LAB
ALBUMIN SERPL-MCNC: 2.5 G/DL (ref 3.4–5)
ALP SERPL-CCNC: 91 U/L (ref 45–117)
ALT SERPL-CCNC: 22 U/L (ref 13–61)
ANION GAP SERPL CALC-SCNC: 9 MMOL/L (ref 8–16)
ANISOCYTOSIS BLD QL: (no result)
AST SERPL-CCNC: 15 U/L (ref 15–37)
BASOPHILS # BLD: 0.4 % (ref 0–2)
BILIRUB SERPL-MCNC: 0.8 MG/DL (ref 0.2–1)
BUN SERPL-MCNC: 32.4 MG/DL (ref 7–18)
CALCIUM SERPL-MCNC: 9.4 MG/DL (ref 8.5–10.1)
CHLORIDE SERPL-SCNC: 100 MMOL/L (ref 98–107)
CO2 SERPL-SCNC: 26 MMOL/L (ref 21–32)
CREAT SERPL-MCNC: 2.2 MG/DL (ref 0.55–1.3)
DEPRECATED RDW RBC AUTO: 17.7 % (ref 11.9–15.9)
EOSINOPHIL # BLD: 0.7 % (ref 0–4.5)
GLUCOSE SERPL-MCNC: 143 MG/DL (ref 74–106)
HCT VFR BLD CALC: 33.5 % (ref 35.4–49)
HGB BLD-MCNC: 10.8 GM/DL (ref 11.7–16.9)
LYMPHOCYTES # BLD: 23.2 % (ref 8–40)
MACROCYTES BLD QL: 0
MCH RBC QN AUTO: 24.5 PG (ref 25.7–33.7)
MCHC RBC AUTO-ENTMCNC: 32.1 G/DL (ref 32–35.9)
MCV RBC: 76.1 FL (ref 80–96)
MONOCYTES # BLD AUTO: 5.2 % (ref 3.8–10.2)
NEUTROPHILS # BLD: 70.5 % (ref 42.8–82.8)
OVALOCYTES BLD QL SMEAR: (no result)
PLATELET # BLD AUTO: 509 10^3/UL (ref 134–434)
PLATELET BLD QL SMEAR: (no result)
PMV BLD: 7.8 FL (ref 7.5–11.1)
PROT SERPL-MCNC: 6.8 G/DL (ref 6.4–8.2)
RBC # BLD AUTO: 4.41 M/MM3 (ref 4–5.6)
SODIUM SERPL-SCNC: 136 MMOL/L (ref 136–145)
WBC # BLD AUTO: 12.4 K/MM3 (ref 4–10)

## 2021-12-28 RX ADMIN — HEPARIN SODIUM SCH UNIT: 5000 INJECTION, SOLUTION INTRAVENOUS; SUBCUTANEOUS at 21:49

## 2021-12-28 RX ADMIN — LABETALOL HYDROCHLORIDE SCH MG: 200 TABLET, FILM COATED ORAL at 21:49

## 2021-12-28 RX ADMIN — PANTOPRAZOLE SODIUM SCH MG: 40 TABLET, DELAYED RELEASE ORAL at 09:43

## 2021-12-28 RX ADMIN — HEPARIN SODIUM SCH UNIT: 5000 INJECTION, SOLUTION INTRAVENOUS; SUBCUTANEOUS at 06:18

## 2021-12-28 RX ADMIN — LABETALOL HYDROCHLORIDE SCH MG: 200 TABLET, FILM COATED ORAL at 09:44

## 2021-12-28 RX ADMIN — PREDNISONE SCH MG: 20 TABLET ORAL at 09:43

## 2021-12-28 RX ADMIN — TAMSULOSIN HYDROCHLORIDE SCH MG: 0.4 CAPSULE ORAL at 09:44

## 2021-12-28 RX ADMIN — NIFEDIPINE SCH MG: 60 TABLET, EXTENDED RELEASE ORAL at 21:50

## 2021-12-28 RX ADMIN — HEPARIN SODIUM SCH UNIT: 5000 INJECTION, SOLUTION INTRAVENOUS; SUBCUTANEOUS at 14:26

## 2021-12-28 RX ADMIN — NIFEDIPINE SCH MG: 60 TABLET, EXTENDED RELEASE ORAL at 09:47

## 2021-12-28 RX ADMIN — ASPIRIN SCH MG: 81 TABLET, COATED ORAL at 09:43

## 2021-12-28 RX ADMIN — COLCHICINE SCH MG: 0.6 CAPSULE ORAL at 09:47

## 2021-12-28 RX ADMIN — LOSARTAN POTASSIUM SCH MG: 50 TABLET, FILM COATED ORAL at 09:43

## 2021-12-29 VITALS — DIASTOLIC BLOOD PRESSURE: 83 MMHG | TEMPERATURE: 98 F | HEART RATE: 73 BPM | SYSTOLIC BLOOD PRESSURE: 148 MMHG

## 2021-12-29 LAB
ANION GAP SERPL CALC-SCNC: 9 MMOL/L (ref 8–16)
BUN SERPL-MCNC: 32.6 MG/DL (ref 7–18)
CALCIUM SERPL-MCNC: 9 MG/DL (ref 8.5–10.1)
CHLORIDE SERPL-SCNC: 103 MMOL/L (ref 98–107)
CO2 SERPL-SCNC: 26 MMOL/L (ref 21–32)
CREAT SERPL-MCNC: 2.1 MG/DL (ref 0.55–1.3)
GLUCOSE SERPL-MCNC: 79 MG/DL (ref 74–106)
SODIUM SERPL-SCNC: 138 MMOL/L (ref 136–145)

## 2021-12-29 RX ADMIN — PANTOPRAZOLE SODIUM SCH MG: 40 TABLET, DELAYED RELEASE ORAL at 09:05

## 2021-12-29 RX ADMIN — HEPARIN SODIUM SCH UNIT: 5000 INJECTION, SOLUTION INTRAVENOUS; SUBCUTANEOUS at 05:15

## 2021-12-29 RX ADMIN — NIFEDIPINE SCH MG: 60 TABLET, EXTENDED RELEASE ORAL at 09:07

## 2021-12-29 RX ADMIN — HEPARIN SODIUM SCH UNIT: 5000 INJECTION, SOLUTION INTRAVENOUS; SUBCUTANEOUS at 13:29

## 2021-12-29 RX ADMIN — LABETALOL HYDROCHLORIDE SCH MG: 200 TABLET, FILM COATED ORAL at 09:03

## 2021-12-29 RX ADMIN — TAMSULOSIN HYDROCHLORIDE SCH MG: 0.4 CAPSULE ORAL at 09:03

## 2021-12-29 RX ADMIN — COLCHICINE SCH MG: 0.6 CAPSULE ORAL at 09:04

## 2021-12-29 RX ADMIN — LOSARTAN POTASSIUM SCH MG: 50 TABLET, FILM COATED ORAL at 09:04

## 2021-12-29 RX ADMIN — ASPIRIN SCH MG: 81 TABLET, COATED ORAL at 09:03
